# Patient Record
Sex: FEMALE | Race: WHITE | Employment: UNEMPLOYED | URBAN - METROPOLITAN AREA
[De-identification: names, ages, dates, MRNs, and addresses within clinical notes are randomized per-mention and may not be internally consistent; named-entity substitution may affect disease eponyms.]

---

## 2017-05-08 ENCOUNTER — HOSPITAL ENCOUNTER (OUTPATIENT)
Dept: PREADMISSION TESTING | Age: 62
Discharge: HOME | End: 2017-05-08
Payer: OTHER MISCELLANEOUS

## 2017-05-08 VITALS
DIASTOLIC BLOOD PRESSURE: 70 MMHG | HEIGHT: 63 IN | WEIGHT: 165 LBS | HEART RATE: 80 BPM | SYSTOLIC BLOOD PRESSURE: 124 MMHG | BODY MASS INDEX: 29.23 KG/M2

## 2017-05-08 LAB
ABO + RH BLD: NORMAL
BLOOD GROUP ANTIBODIES SERPL: NORMAL
SPECIMEN EXP DATE BLD: NORMAL

## 2017-05-08 PROCEDURE — 86900 BLOOD TYPING SEROLOGIC ABO: CPT | Performed by: ORTHOPAEDIC SURGERY

## 2017-05-08 PROCEDURE — 36415 COLL VENOUS BLD VENIPUNCTURE: CPT | Performed by: ORTHOPAEDIC SURGERY

## 2017-05-08 RX ORDER — LORATADINE AND PSEUDOEPHEDRINE 10; 240 MG/1; MG/1
1 TABLET, EXTENDED RELEASE ORAL DAILY
COMMUNITY

## 2017-05-08 RX ORDER — CHOLECALCIFEROL (VITAMIN D3) 125 MCG
CAPSULE ORAL
COMMUNITY

## 2017-05-08 NOTE — PERIOP NOTES
PREOPERATIVE INSTRUCTIONS REVIEWED WITH PATIENT. PATIENT GIVEN SIX PACK OF CHG WIPES. INSTRUCTIONS REVIEWED IN CLASS ON USE OF CHG WIPES. PATIENT GIVEN SSI INFECTION SHEET. MRSA/MSSA TREATMENT INSTRUCTION SHEET GIVEN WITH AN EXPLANATION TO PATIENT THAT THEY WILL BE NOTIFIED IF TREATMENT INSTRUCTIONS NEED TO BE INITIATED. PATIENT WAS GIVEN THE  OPPORTUNITY TO ASK QUESTIONS ON THE INFORMATION PROVIDED.

## 2017-05-09 LAB
BACTERIA SPEC CULT: NORMAL
BACTERIA SPEC CULT: NORMAL
SERVICE CMNT-IMP: NORMAL

## 2017-05-17 ENCOUNTER — ANESTHESIA EVENT (OUTPATIENT)
Dept: SURGERY | Age: 62
DRG: 470 | End: 2017-05-17
Payer: OTHER MISCELLANEOUS

## 2017-05-18 ENCOUNTER — ANESTHESIA (OUTPATIENT)
Dept: SURGERY | Age: 62
DRG: 470 | End: 2017-05-18
Payer: OTHER MISCELLANEOUS

## 2017-05-18 ENCOUNTER — HOSPITAL ENCOUNTER (INPATIENT)
Age: 62
LOS: 2 days | Discharge: HOME HEALTH | DRG: 470 | End: 2017-05-20
Attending: ORTHOPAEDIC SURGERY | Admitting: ORTHOPAEDIC SURGERY
Payer: OTHER MISCELLANEOUS

## 2017-05-18 PROBLEM — M17.9 OA (OSTEOARTHRITIS) OF KNEE: Status: ACTIVE | Noted: 2017-05-18

## 2017-05-18 LAB
GLUCOSE BLD STRIP.AUTO-MCNC: 98 MG/DL (ref 65–100)
INR PPP: 1 (ref 0.9–1.1)
PROTHROMBIN TIME: 10.2 SEC (ref 9–11.1)
SERVICE CMNT-IMP: NORMAL

## 2017-05-18 PROCEDURE — 74011000250 HC RX REV CODE- 250

## 2017-05-18 PROCEDURE — 77030006835 HC BLD SAW SAG STRY -B: Performed by: ORTHOPAEDIC SURGERY

## 2017-05-18 PROCEDURE — 77030018846 HC SOL IRR STRL H20 ICUM -A: Performed by: ORTHOPAEDIC SURGERY

## 2017-05-18 PROCEDURE — 77030006822 HC BLD SAW SAG BRSM -B: Performed by: ORTHOPAEDIC SURGERY

## 2017-05-18 PROCEDURE — 77030028224 HC PDNG CST BSNM -A: Performed by: ORTHOPAEDIC SURGERY

## 2017-05-18 PROCEDURE — 74011000258 HC RX REV CODE- 258

## 2017-05-18 PROCEDURE — 77030020788: Performed by: ORTHOPAEDIC SURGERY

## 2017-05-18 PROCEDURE — 74011000258 HC RX REV CODE- 258: Performed by: ORTHOPAEDIC SURGERY

## 2017-05-18 PROCEDURE — 77030013079 HC BLNKT BAIR HGGR 3M -A: Performed by: ANESTHESIOLOGY

## 2017-05-18 PROCEDURE — 77030018836 HC SOL IRR NACL ICUM -A: Performed by: ORTHOPAEDIC SURGERY

## 2017-05-18 PROCEDURE — 76010000173 HC OR TIME 3 TO 3.5 HR INTENSV-TIER 1: Performed by: ORTHOPAEDIC SURGERY

## 2017-05-18 PROCEDURE — 77030003601 HC NDL NRV BLK BBMI -A

## 2017-05-18 PROCEDURE — 76060000037 HC ANESTHESIA 3 TO 3.5 HR: Performed by: ORTHOPAEDIC SURGERY

## 2017-05-18 PROCEDURE — 74011250636 HC RX REV CODE- 250/636: Performed by: ANESTHESIOLOGY

## 2017-05-18 PROCEDURE — C1776 JOINT DEVICE (IMPLANTABLE): HCPCS | Performed by: ORTHOPAEDIC SURGERY

## 2017-05-18 PROCEDURE — 77030018822 HC SLV COMPR FT COVD -B

## 2017-05-18 PROCEDURE — C9290 INJ, BUPIVACAINE LIPOSOME: HCPCS | Performed by: ORTHOPAEDIC SURGERY

## 2017-05-18 PROCEDURE — 74011250637 HC RX REV CODE- 250/637: Performed by: ORTHOPAEDIC SURGERY

## 2017-05-18 PROCEDURE — 36415 COLL VENOUS BLD VENIPUNCTURE: CPT | Performed by: ORTHOPAEDIC SURGERY

## 2017-05-18 PROCEDURE — 77030034850: Performed by: ORTHOPAEDIC SURGERY

## 2017-05-18 PROCEDURE — C1713 ANCHOR/SCREW BN/BN,TIS/BN: HCPCS | Performed by: ORTHOPAEDIC SURGERY

## 2017-05-18 PROCEDURE — 77030011640 HC PAD GRND REM COVD -A: Performed by: ORTHOPAEDIC SURGERY

## 2017-05-18 PROCEDURE — 77030035236 HC SUT PDS STRATFX BARB J&J -B: Performed by: ORTHOPAEDIC SURGERY

## 2017-05-18 PROCEDURE — 77030016547 HC BLD SAW SAG1 STRY -B: Performed by: ORTHOPAEDIC SURGERY

## 2017-05-18 PROCEDURE — 3E0T3CZ INTRODUCE REGIONAL ANESTH IN PERIPH NRV, PLEXI, PERC: ICD-10-PCS | Performed by: ANESTHESIOLOGY

## 2017-05-18 PROCEDURE — 74011250636 HC RX REV CODE- 250/636: Performed by: ORTHOPAEDIC SURGERY

## 2017-05-18 PROCEDURE — 74011000250 HC RX REV CODE- 250: Performed by: ORTHOPAEDIC SURGERY

## 2017-05-18 PROCEDURE — 77030002933 HC SUT MCRYL J&J -A: Performed by: ORTHOPAEDIC SURGERY

## 2017-05-18 PROCEDURE — 74011250636 HC RX REV CODE- 250/636

## 2017-05-18 PROCEDURE — 85610 PROTHROMBIN TIME: CPT | Performed by: ORTHOPAEDIC SURGERY

## 2017-05-18 PROCEDURE — 64450 NJX AA&/STRD OTHER PN/BRANCH: CPT

## 2017-05-18 PROCEDURE — 76210000006 HC OR PH I REC 0.5 TO 1 HR: Performed by: ORTHOPAEDIC SURGERY

## 2017-05-18 PROCEDURE — 77030031139 HC SUT VCRL2 J&J -A: Performed by: ORTHOPAEDIC SURGERY

## 2017-05-18 PROCEDURE — 82962 GLUCOSE BLOOD TEST: CPT

## 2017-05-18 PROCEDURE — 77030007866 HC KT SPN ANES BBMI -B: Performed by: ANESTHESIOLOGY

## 2017-05-18 PROCEDURE — 65270000029 HC RM PRIVATE

## 2017-05-18 PROCEDURE — 0SRD0J9 REPLACEMENT OF LEFT KNEE JOINT WITH SYNTHETIC SUBSTITUTE, CEMENTED, OPEN APPROACH: ICD-10-PCS | Performed by: ORTHOPAEDIC SURGERY

## 2017-05-18 DEVICE — PLUG STEM TIV FLX TAPR FOR MG II ST BNE SCR NXGN: Type: IMPLANTABLE DEVICE | Site: KNEE | Status: FUNCTIONAL

## 2017-05-18 DEVICE — IMPLANTABLE DEVICE: Type: IMPLANTABLE DEVICE | Status: FUNCTIONAL

## 2017-05-18 DEVICE — PLATE TIB STEM PC NXGN SZ 3 --: Type: IMPLANTABLE DEVICE | Site: KNEE | Status: FUNCTIONAL

## 2017-05-18 DEVICE — CEMENT BNE 40GM FULL DOSE PMMA W/O ANTIBIO HI VISC N RADPQ: Type: IMPLANTABLE DEVICE | Site: KNEE | Status: FUNCTIONAL

## 2017-05-18 RX ORDER — AMOXICILLIN 250 MG
1 CAPSULE ORAL 2 TIMES DAILY
Status: DISCONTINUED | OUTPATIENT
Start: 2017-05-18 | End: 2017-05-20 | Stop reason: HOSPADM

## 2017-05-18 RX ORDER — PROPOFOL 10 MG/ML
INJECTION, EMULSION INTRAVENOUS
Status: DISCONTINUED | OUTPATIENT
Start: 2017-05-18 | End: 2017-05-18 | Stop reason: HOSPADM

## 2017-05-18 RX ORDER — CEFAZOLIN SODIUM IN 0.9 % NACL 2 G/50 ML
2 INTRAVENOUS SOLUTION, PIGGYBACK (ML) INTRAVENOUS ONCE
Status: COMPLETED | OUTPATIENT
Start: 2017-05-18 | End: 2017-05-18

## 2017-05-18 RX ORDER — HYDROMORPHONE HYDROCHLORIDE 1 MG/ML
0.2 INJECTION, SOLUTION INTRAMUSCULAR; INTRAVENOUS; SUBCUTANEOUS
Status: DISCONTINUED | OUTPATIENT
Start: 2017-05-18 | End: 2017-05-18 | Stop reason: HOSPADM

## 2017-05-18 RX ORDER — DIAZEPAM 2 MG/1
5 TABLET ORAL
Status: DISCONTINUED | OUTPATIENT
Start: 2017-05-18 | End: 2017-05-20 | Stop reason: HOSPADM

## 2017-05-18 RX ORDER — PHENYLEPHRINE HCL IN 0.9% NACL 0.4MG/10ML
SYRINGE (ML) INTRAVENOUS AS NEEDED
Status: DISCONTINUED | OUTPATIENT
Start: 2017-05-18 | End: 2017-05-18 | Stop reason: HOSPADM

## 2017-05-18 RX ORDER — NALOXONE HYDROCHLORIDE 0.4 MG/ML
0.4 INJECTION, SOLUTION INTRAMUSCULAR; INTRAVENOUS; SUBCUTANEOUS AS NEEDED
Status: DISCONTINUED | OUTPATIENT
Start: 2017-05-18 | End: 2017-05-20 | Stop reason: HOSPADM

## 2017-05-18 RX ORDER — DEXTROSE, SODIUM CHLORIDE, SODIUM LACTATE, POTASSIUM CHLORIDE, AND CALCIUM CHLORIDE 5; .6; .31; .03; .02 G/100ML; G/100ML; G/100ML; G/100ML; G/100ML
25 INJECTION, SOLUTION INTRAVENOUS CONTINUOUS
Status: DISCONTINUED | OUTPATIENT
Start: 2017-05-18 | End: 2017-05-18 | Stop reason: HOSPADM

## 2017-05-18 RX ORDER — BACITRACIN ZINC 500 UNIT/G
OINTMENT (GRAM) TOPICAL ONCE
Status: COMPLETED | OUTPATIENT
Start: 2017-05-18 | End: 2017-05-18

## 2017-05-18 RX ORDER — SODIUM CHLORIDE 0.9 % (FLUSH) 0.9 %
5-10 SYRINGE (ML) INJECTION AS NEEDED
Status: DISCONTINUED | OUTPATIENT
Start: 2017-05-18 | End: 2017-05-18 | Stop reason: HOSPADM

## 2017-05-18 RX ORDER — FACIAL-BODY WIPES
10 EACH TOPICAL DAILY PRN
Status: DISCONTINUED | OUTPATIENT
Start: 2017-05-20 | End: 2017-05-20 | Stop reason: HOSPADM

## 2017-05-18 RX ORDER — DEXTROAMPHETAMINE SACCHARATE, AMPHETAMINE ASPARTATE, DEXTROAMPHETAMINE SULFATE AND AMPHETAMINE SULFATE 2.5; 2.5; 2.5; 2.5 MG/1; MG/1; MG/1; MG/1
30 TABLET ORAL
Status: DISCONTINUED | OUTPATIENT
Start: 2017-05-19 | End: 2017-05-20 | Stop reason: HOSPADM

## 2017-05-18 RX ORDER — HYDROMORPHONE HYDROCHLORIDE 1 MG/ML
0.5 INJECTION, SOLUTION INTRAMUSCULAR; INTRAVENOUS; SUBCUTANEOUS
Status: DISPENSED | OUTPATIENT
Start: 2017-05-18 | End: 2017-05-19

## 2017-05-18 RX ORDER — MORPHINE SULFATE 2 MG/ML
2 INJECTION, SOLUTION INTRAMUSCULAR; INTRAVENOUS
Status: DISCONTINUED | OUTPATIENT
Start: 2017-05-18 | End: 2017-05-18 | Stop reason: HOSPADM

## 2017-05-18 RX ORDER — ONDANSETRON 2 MG/ML
4 INJECTION INTRAMUSCULAR; INTRAVENOUS AS NEEDED
Status: DISCONTINUED | OUTPATIENT
Start: 2017-05-18 | End: 2017-05-18 | Stop reason: HOSPADM

## 2017-05-18 RX ORDER — ONDANSETRON 2 MG/ML
INJECTION INTRAMUSCULAR; INTRAVENOUS AS NEEDED
Status: DISCONTINUED | OUTPATIENT
Start: 2017-05-18 | End: 2017-05-18 | Stop reason: HOSPADM

## 2017-05-18 RX ORDER — MELATONIN
2000
Status: DISCONTINUED | OUTPATIENT
Start: 2017-05-19 | End: 2017-05-20 | Stop reason: HOSPADM

## 2017-05-18 RX ORDER — FENTANYL CITRATE 50 UG/ML
INJECTION, SOLUTION INTRAMUSCULAR; INTRAVENOUS AS NEEDED
Status: DISCONTINUED | OUTPATIENT
Start: 2017-05-18 | End: 2017-05-18 | Stop reason: HOSPADM

## 2017-05-18 RX ORDER — CEFAZOLIN SODIUM IN 0.9 % NACL 2 G/50 ML
2 INTRAVENOUS SOLUTION, PIGGYBACK (ML) INTRAVENOUS EVERY 8 HOURS
Status: COMPLETED | OUTPATIENT
Start: 2017-05-18 | End: 2017-05-19

## 2017-05-18 RX ORDER — DIPHENHYDRAMINE HYDROCHLORIDE 50 MG/ML
12.5 INJECTION, SOLUTION INTRAMUSCULAR; INTRAVENOUS AS NEEDED
Status: DISCONTINUED | OUTPATIENT
Start: 2017-05-18 | End: 2017-05-18 | Stop reason: HOSPADM

## 2017-05-18 RX ORDER — SODIUM CHLORIDE, SODIUM LACTATE, POTASSIUM CHLORIDE, CALCIUM CHLORIDE 600; 310; 30; 20 MG/100ML; MG/100ML; MG/100ML; MG/100ML
25 INJECTION, SOLUTION INTRAVENOUS CONTINUOUS
Status: DISCONTINUED | OUTPATIENT
Start: 2017-05-18 | End: 2017-05-18 | Stop reason: HOSPADM

## 2017-05-18 RX ORDER — SODIUM CHLORIDE 0.9 % (FLUSH) 0.9 %
5-10 SYRINGE (ML) INJECTION AS NEEDED
Status: DISCONTINUED | OUTPATIENT
Start: 2017-05-18 | End: 2017-05-20 | Stop reason: HOSPADM

## 2017-05-18 RX ORDER — WARFARIN SODIUM 5 MG/1
5 TABLET ORAL ONCE
Status: COMPLETED | OUTPATIENT
Start: 2017-05-18 | End: 2017-05-18

## 2017-05-18 RX ORDER — KETOROLAC TROMETHAMINE 30 MG/ML
15 INJECTION, SOLUTION INTRAMUSCULAR; INTRAVENOUS EVERY 6 HOURS
Status: DISPENSED | OUTPATIENT
Start: 2017-05-18 | End: 2017-05-19

## 2017-05-18 RX ORDER — SODIUM CHLORIDE 0.9 % (FLUSH) 0.9 %
5-10 SYRINGE (ML) INJECTION EVERY 8 HOURS
Status: DISCONTINUED | OUTPATIENT
Start: 2017-05-18 | End: 2017-05-18 | Stop reason: HOSPADM

## 2017-05-18 RX ORDER — DEXAMETHASONE SODIUM PHOSPHATE 4 MG/ML
INJECTION, SOLUTION INTRA-ARTICULAR; INTRALESIONAL; INTRAMUSCULAR; INTRAVENOUS; SOFT TISSUE AS NEEDED
Status: DISCONTINUED | OUTPATIENT
Start: 2017-05-18 | End: 2017-05-18 | Stop reason: HOSPADM

## 2017-05-18 RX ORDER — MIDAZOLAM HYDROCHLORIDE 1 MG/ML
1 INJECTION, SOLUTION INTRAMUSCULAR; INTRAVENOUS AS NEEDED
Status: DISCONTINUED | OUTPATIENT
Start: 2017-05-18 | End: 2017-05-18 | Stop reason: HOSPADM

## 2017-05-18 RX ORDER — SODIUM CHLORIDE 0.9 % (FLUSH) 0.9 %
5-10 SYRINGE (ML) INJECTION EVERY 8 HOURS
Status: DISCONTINUED | OUTPATIENT
Start: 2017-05-19 | End: 2017-05-20 | Stop reason: HOSPADM

## 2017-05-18 RX ORDER — FENTANYL CITRATE 50 UG/ML
25 INJECTION, SOLUTION INTRAMUSCULAR; INTRAVENOUS
Status: COMPLETED | OUTPATIENT
Start: 2017-05-18 | End: 2017-05-18

## 2017-05-18 RX ORDER — FENTANYL CITRATE 50 UG/ML
50 INJECTION, SOLUTION INTRAMUSCULAR; INTRAVENOUS AS NEEDED
Status: DISCONTINUED | OUTPATIENT
Start: 2017-05-18 | End: 2017-05-18 | Stop reason: HOSPADM

## 2017-05-18 RX ORDER — PROPOFOL 10 MG/ML
INJECTION, EMULSION INTRAVENOUS AS NEEDED
Status: DISCONTINUED | OUTPATIENT
Start: 2017-05-18 | End: 2017-05-18 | Stop reason: HOSPADM

## 2017-05-18 RX ORDER — SODIUM CHLORIDE 9 MG/ML
125 INJECTION, SOLUTION INTRAVENOUS CONTINUOUS
Status: DISPENSED | OUTPATIENT
Start: 2017-05-18 | End: 2017-05-19

## 2017-05-18 RX ORDER — MIDAZOLAM HYDROCHLORIDE 1 MG/ML
INJECTION, SOLUTION INTRAMUSCULAR; INTRAVENOUS AS NEEDED
Status: DISCONTINUED | OUTPATIENT
Start: 2017-05-18 | End: 2017-05-18 | Stop reason: HOSPADM

## 2017-05-18 RX ORDER — OXYCODONE HYDROCHLORIDE 5 MG/1
5 TABLET ORAL
Status: DISCONTINUED | OUTPATIENT
Start: 2017-05-18 | End: 2017-05-20 | Stop reason: HOSPADM

## 2017-05-18 RX ORDER — OXYCODONE HYDROCHLORIDE 5 MG/1
10 TABLET ORAL
Status: DISCONTINUED | OUTPATIENT
Start: 2017-05-18 | End: 2017-05-20 | Stop reason: HOSPADM

## 2017-05-18 RX ORDER — BUPIVACAINE HYDROCHLORIDE 7.5 MG/ML
INJECTION, SOLUTION EPIDURAL; RETROBULBAR AS NEEDED
Status: DISCONTINUED | OUTPATIENT
Start: 2017-05-18 | End: 2017-05-18 | Stop reason: HOSPADM

## 2017-05-18 RX ORDER — ACETAMINOPHEN 325 MG/1
650 TABLET ORAL
Status: DISCONTINUED | OUTPATIENT
Start: 2017-05-18 | End: 2017-05-19

## 2017-05-18 RX ORDER — LIDOCAINE HYDROCHLORIDE 10 MG/ML
0.1 INJECTION, SOLUTION EPIDURAL; INFILTRATION; INTRACAUDAL; PERINEURAL AS NEEDED
Status: DISCONTINUED | OUTPATIENT
Start: 2017-05-18 | End: 2017-05-18 | Stop reason: HOSPADM

## 2017-05-18 RX ORDER — SODIUM CHLORIDE, SODIUM LACTATE, POTASSIUM CHLORIDE, CALCIUM CHLORIDE 600; 310; 30; 20 MG/100ML; MG/100ML; MG/100ML; MG/100ML
INJECTION, SOLUTION INTRAVENOUS
Status: DISCONTINUED | OUTPATIENT
Start: 2017-05-18 | End: 2017-05-18 | Stop reason: HOSPADM

## 2017-05-18 RX ORDER — SODIUM CHLORIDE 9 MG/ML
1000 INJECTION, SOLUTION INTRAVENOUS CONTINUOUS
Status: DISCONTINUED | OUTPATIENT
Start: 2017-05-18 | End: 2017-05-18 | Stop reason: HOSPADM

## 2017-05-18 RX ORDER — POLYETHYLENE GLYCOL 3350 17 G/17G
17 POWDER, FOR SOLUTION ORAL DAILY
Status: DISCONTINUED | OUTPATIENT
Start: 2017-05-19 | End: 2017-05-20 | Stop reason: HOSPADM

## 2017-05-18 RX ORDER — SODIUM CHLORIDE 9 MG/ML
25 INJECTION, SOLUTION INTRAVENOUS CONTINUOUS
Status: DISCONTINUED | OUTPATIENT
Start: 2017-05-18 | End: 2017-05-18 | Stop reason: HOSPADM

## 2017-05-18 RX ORDER — MIDAZOLAM HYDROCHLORIDE 1 MG/ML
0.5 INJECTION, SOLUTION INTRAMUSCULAR; INTRAVENOUS
Status: DISCONTINUED | OUTPATIENT
Start: 2017-05-18 | End: 2017-05-18 | Stop reason: HOSPADM

## 2017-05-18 RX ADMIN — BUPIVACAINE HYDROCHLORIDE 12 MG: 7.5 INJECTION, SOLUTION EPIDURAL; RETROBULBAR at 11:27

## 2017-05-18 RX ADMIN — PROPOFOL 40 MCG/KG/MIN: 10 INJECTION, EMULSION INTRAVENOUS at 11:33

## 2017-05-18 RX ADMIN — FENTANYL CITRATE 50 MCG: 50 INJECTION, SOLUTION INTRAMUSCULAR; INTRAVENOUS at 09:22

## 2017-05-18 RX ADMIN — FENTANYL CITRATE 25 MCG: 50 INJECTION, SOLUTION INTRAMUSCULAR; INTRAVENOUS at 16:03

## 2017-05-18 RX ADMIN — PROPOFOL 25 MG: 10 INJECTION, EMULSION INTRAVENOUS at 11:54

## 2017-05-18 RX ADMIN — WARFARIN SODIUM 5 MG: 5 TABLET ORAL at 18:29

## 2017-05-18 RX ADMIN — MIDAZOLAM HYDROCHLORIDE 2 MG: 1 INJECTION, SOLUTION INTRAMUSCULAR; INTRAVENOUS at 09:21

## 2017-05-18 RX ADMIN — SODIUM CHLORIDE, SODIUM LACTATE, POTASSIUM CHLORIDE, CALCIUM CHLORIDE: 600; 310; 30; 20 INJECTION, SOLUTION INTRAVENOUS at 11:09

## 2017-05-18 RX ADMIN — DEXAMETHASONE SODIUM PHOSPHATE 8 MG: 4 INJECTION, SOLUTION INTRA-ARTICULAR; INTRALESIONAL; INTRAMUSCULAR; INTRAVENOUS; SOFT TISSUE at 11:46

## 2017-05-18 RX ADMIN — PROPOFOL 50 MG: 10 INJECTION, EMULSION INTRAVENOUS at 11:38

## 2017-05-18 RX ADMIN — ONDANSETRON 4 MG: 2 INJECTION INTRAMUSCULAR; INTRAVENOUS at 12:30

## 2017-05-18 RX ADMIN — DOCUSATE SODIUM AND SENNOSIDES 1 TABLET: 8.6; 5 TABLET, FILM COATED ORAL at 18:29

## 2017-05-18 RX ADMIN — FENTANYL CITRATE 25 MCG: 50 INJECTION, SOLUTION INTRAMUSCULAR; INTRAVENOUS at 16:10

## 2017-05-18 RX ADMIN — FENTANYL CITRATE 25 MCG: 50 INJECTION, SOLUTION INTRAMUSCULAR; INTRAVENOUS at 15:57

## 2017-05-18 RX ADMIN — FENTANYL CITRATE 25 MCG: 50 INJECTION, SOLUTION INTRAMUSCULAR; INTRAVENOUS at 16:23

## 2017-05-18 RX ADMIN — SODIUM CHLORIDE, SODIUM LACTATE, POTASSIUM CHLORIDE, AND CALCIUM CHLORIDE 25 ML/HR: 600; 310; 30; 20 INJECTION, SOLUTION INTRAVENOUS at 08:53

## 2017-05-18 RX ADMIN — SODIUM CHLORIDE, SODIUM LACTATE, POTASSIUM CHLORIDE, CALCIUM CHLORIDE: 600; 310; 30; 20 INJECTION, SOLUTION INTRAVENOUS at 13:00

## 2017-05-18 RX ADMIN — KETOROLAC TROMETHAMINE 15 MG: 30 INJECTION, SOLUTION INTRAMUSCULAR at 18:29

## 2017-05-18 RX ADMIN — Medication 80 MCG: at 13:51

## 2017-05-18 RX ADMIN — OXYCODONE HYDROCHLORIDE 5 MG: 5 TABLET ORAL at 21:21

## 2017-05-18 RX ADMIN — Medication 80 MCG: at 11:58

## 2017-05-18 RX ADMIN — PROPOFOL 50 MG: 10 INJECTION, EMULSION INTRAVENOUS at 11:33

## 2017-05-18 RX ADMIN — PROPOFOL 100 MG: 10 INJECTION, EMULSION INTRAVENOUS at 13:04

## 2017-05-18 RX ADMIN — MIDAZOLAM HYDROCHLORIDE 2 MG: 1 INJECTION, SOLUTION INTRAMUSCULAR; INTRAVENOUS at 11:20

## 2017-05-18 RX ADMIN — CEFAZOLIN 2 G: 1 INJECTION, POWDER, FOR SOLUTION INTRAMUSCULAR; INTRAVENOUS; PARENTERAL at 19:09

## 2017-05-18 RX ADMIN — Medication 80 MCG: at 12:07

## 2017-05-18 RX ADMIN — FENTANYL CITRATE 50 MCG: 50 INJECTION, SOLUTION INTRAMUSCULAR; INTRAVENOUS at 11:20

## 2017-05-18 RX ADMIN — HYDROMORPHONE HYDROCHLORIDE 0.5 MG: 1 INJECTION, SOLUTION INTRAMUSCULAR; INTRAVENOUS; SUBCUTANEOUS at 17:16

## 2017-05-18 RX ADMIN — SODIUM CHLORIDE 125 ML/HR: 900 INJECTION, SOLUTION INTRAVENOUS at 15:55

## 2017-05-18 RX ADMIN — CEFAZOLIN 2 G: 1 INJECTION, POWDER, FOR SOLUTION INTRAMUSCULAR; INTRAVENOUS; PARENTERAL at 11:37

## 2017-05-18 NOTE — ANESTHESIA PROCEDURE NOTES
Spinal Block    Start time: 5/18/2017 11:25 AM  End time: 5/18/2017 11:30 AM  Performed by: Dev Enciso  Authorized by: Elijah Retana     Pre-procedure:   Indications: primary anesthetic  Preanesthetic Checklist: risks and benefits discussed and timeout performed      Spinal Block:   Patient Position:  Seated    Prep: Betadine      Location:  L3-4  Technique:  Single shot  Local:  Lidocaine 1%  Local Dose (mL):  3    Needle:   Needle Type:  Pencil-tip  Needle Gauge:  25 G  Attempts:  1      Events: CSF confirmed, no blood with aspiration and no paresthesia        Assessment:  Insertion:  Uncomplicated  Patient tolerance:  Patient tolerated the procedure well with no immediate complications

## 2017-05-18 NOTE — PROGRESS NOTES
Bedside and Verbal shift change report given to Tiarra (oncoming nurse) by Sally Dominguez (offgoing nurse). Report included the following information SBAR, Kardex, Procedure Summary, Intake/Output and Recent Results.

## 2017-05-18 NOTE — OP NOTES
[address]   [address] [place] [address], [address]   OP NOTE       Name:  Ramses Cullen   MR#:  [id number]   :  1955   Account #:  [de-identified]    Surgery Date:  2017   Date of Adm:  2017       PREOPERATIVE DIAGNOSIS: Osteoarthritis of the left knee. POSTOPERATIVE DIAGNOSIS: Osteoarthritis of the left knee. PROCEDURES PERFORMED: Left total knee arthroplasty. SURGEON: Ivon Brown MD.    ASSISTANT: Tech. ANESTHESIA: Spinal with MAC. ESTIMATED BLOOD LOSS: Less than 200 mL. FLUIDS: Crystalloids were given. COMPLICATIONS: Zero. PREOPERATIVE MEDICINE: Two grams Ancef. SPECIMENS REMOVED: Bone discarded. IMPLANT: This was a full titanium knee from a [employer] NexGen   system with a size F femur, size 3 tibia with a 10 mm post-stabilized   poly, and a 32 patella. All components cemented. HISTORY: The patient is a 71-year-old who presented to me from one   of my partners. She had developed increasing discomfort of her left   knee secondary osteoarthritis of the knee joint. She had conservative   treatment performed for this. She had also had a knee arthroscopy   performed by another surgeon. She had bone-on-bone arthritis and   significant patellofemoral crepitation with evidence of arthritis. In   consultation with the patient, we discussed different treatment options. I felt due to the deformity and pain that she was a good candidate for   total knee replacement. We talked about the risk of infection, DVT, PE,   MI, CVA, and other unforeseen events that could occur in a   perioperative fashion. We talked about the risk of continued pain and   loss of range of motion, understanding the artificial joints do sometimes   not restore full functionality of the joint. She understood metal and   plastic would be implanted in the body. During our initial conversation   in the office, the patient had NO ALLERGIES LISTED.  Through her   intake here at [facility], the patient had told a nurse that she   had INFLAMMATORY REACTION WITH THE USE OF EARRINGS. SHE TOLD ME THAT WHEN SHE USES EARRINGS, SHE HAS   SOMETIMES INFECTIONS IN HER EAR LOBES SO, THEREFORE,   SHE DOES NOT USE THESE ITEMS. She has worn jewelry before   without any problems, but currently does not wear through jewelry. Interestingly, I also worked on her mother as well as her sister. Her   sister had prior total knee replacement and was doing very well. I was   able to relocate the operative note from her sister and indeed her sister   did actually give me this information and we used a Zirconium knee   because of this. I talked to the patient about this, we were   not equipped to have that system here today. We did make several   phone calls and we were able to locate a product that is made by   [employer], which does not have a nickel in the system. I felt that this was   a good opportunity to continue to proceed with the surgery. This was   the NexGen knee system which I am very comfortable with it. I   explained to the patient the situation and she understood and was   willing to proceed with the surgery. Again, discussing all of this and   all the risks already mentioned, the patient wished to proceed, signed   consent and was taken to surgery. DESCRIPTION OF PROCEDURE: The patient was taken back to   surgery, placed supine on the table, administered spinal anesthetic by   an anesthesiologist. A Rodríguez was placed by an RN. A tourniquet was   applied on the left upper thigh. The patient was placed supine with all   bony prominences protected. Ancef 2 grams was administered. At that   time, a sterile prep and drape was performed on the left knee. A time-  out was performed and all parties agreed the left knee was the correct   knee. An Esmarch was used to exsanguinate the extremity. Tourniquet   was inflated up to 325 mmHg.  An anterior midline incision was made   with the 10 blade, sharp dissection taken down the skin and blunt   dissection was taken down to the extensor mechanism. A standard   medial parapatellar arthrotomy was performed with a deep 10 blade. Anteromedial dissection was performed along the tibia. The patella   was everted. Fat pad was removed. Freehand resection of the patella   was performed. We sized this to be 32 and appropriate drill holes were   made. The IM canal of the femur was entered with a reamer. We   placed a distal cutting block in place, set this at a 10 degree cut at 5-  degree valgus and the cut was made respectively. The ACL and the   PCL was removed. The extramedullary jig was placed over the knee. Sagittal slope and varus-valgus angulation was corrected. The cutting   block was inserted with bone pegs and the cut was made. We felt an   additional 2 mm would be necessary after finishing this cut and this   was performed. The bone stock was removed. We then placed a 3-  degree external rotational jig on the femur and used the reference   guide anteriorly. This sized to be an E to an F. We made the   appropriate drill holes for the cutting block. We placed an E and then   an F then E, demonstrated too much bone removal as compared to the   F so we sized to an F. This was screwed into place and appropriate   cuts were made respectively. We then placed the notch guide and   notched the femur in standard fashion. The lateral meniscus and   medial meniscus was then removed. We checked the flexion and   extension gap. We seemed to be still a little bit tight medially. A more   formal medial release was performed as this patient did have a varus   deformity. This did improve. The patient was still a little tight in   extension. At that time, we did trial and trial with a 10 mm did reveal   some tightness. With that we dropped the cutting block on the tibia 2   more millimeters and made another cut, we retrialed and this was more   appropriate for the patient.  All trial components were removed. We   sized the tibia to be a size 3. It was then drilled and broached in   standard fashion. Exparel was then injected in the posterior   compartment. The bone stock was pulsatilely irrigated while cement   was mixed on the back table. We cemented the size 3 tibia and size F   femur, and placed a trial poly into the tray. The knee was extended and   then the patella was cemented. Any excess cement was removed. The   knee was left alone until full curing of the cement. Following curing, we   placed the knee through a range of motion. We trialed with a 10 and   then a 12. Again, the 10 had full extension without jeopardizing   instability. The trial was removed. The wound was copiously irrigated. we inserted the final tray into place and relocated the knee. The   tourniquet was let down. Bleeding was controlled with electrocautery. The wound was copiously irrigated. Combination #2 Vicryl and   Stratafix was used to close the extensor mechanism. A 2-0 Vicryl was   placed in the subcutaneous and dermal layer. A running 4-0 Monocryl   was placed in the epidermis. Steri-Strips were applied over the   incision, followed by Adaptic, Bacitracin ointment and a sterile dry   dressing. The patient was awakened from sedation, transferred to the   recovery room in stable condition.         MD Sarai Griffith / Marlon.Blinks   D:  05/18/2017   14:23   T:  05/18/2017   14:56   Job #:  [id number]

## 2017-05-18 NOTE — H&P
[facility] PRE-OP HISTORY AND PHYSICAL    Subjective:     Patient is a 65 y.o. female presented with a history of left knee pain. Onset of symptoms was gradual with gradually worsening course since that time. She is being admitted for surgical management of this condition. There are no active problems to display for this patient. Past Medical History:   Diagnosis Date    Arthritis     OSTEO    Ill-defined condition     SINUSSITIS, & BRONCHITIS FREQUENTLY      Past Surgical History:   Procedure Laterality Date    HX GI      COLONOSCOPY     HX HEENT      WISDOM TEETH EXTRACTION X 4    HX ORTHOPAEDIC      LEFT KNEE ARTHROSCOPY. Prior to Admission medications    Medication Sig Start Date End Date Taking? Authorizing Provider   cholecalciferol, vitamin D3, (VITAMIN D3) 2,000 unit tab Take  by mouth Daily (before breakfast). Yes Historical Provider   hydrocodone-acetaminophen (NORCO) 5-325 mg per tablet Take 1 tablet by mouth every four (4) hours as needed for Pain. 11/5/14  Yes Deann Renee, DO   loratadine-pseudoephedrine (CLARITIN-D 24 HOUR)  mg per tablet Take 1 Tab by mouth daily. USES GENERIC FOR [facility] AS NEEDED. Historical Provider   dextroamphetamine-amphetamine (ADDERALL) 30 mg tablet Take 30 mg by mouth Daily (before breakfast). PT TAKES BREAKS FROM MED. Lizandro.Bustle OUT OF 7 DAYS\"          Phys MD Gurmeet   diazepam (VALIUM) 5 mg tablet Take 1 tablet by mouth every eight (8) hours as needed for Anxiety.  11/5/14   Deann Renee, DO     Allergies   Allergen Reactions    Nickel Other (comments)     \"SWELLING, & SORENESS\"      Social History   Substance Use Topics    Smoking status: Never Smoker    Smokeless tobacco: Never Used    Alcohol use Yes      Comment: RARELY      Family History   Problem Relation Age of Onset    Heart Disease Mother     Hypertension Mother     Hypertension Father     Heart Disease Father     Heart Disease Sister      HEART MURMUR    Cancer Brother     Heart Disease Brother      ENLARGED HEART    Stroke Maternal Grandmother     Hypertension Maternal Grandmother     Heart Disease Maternal Grandfather       Review of Systems  A comprehensive review of systems was negative except for that written in the HPI. Objective:     Patient Vitals for the past 8 hrs:   BP Temp Pulse Resp SpO2 Height Weight   05/18/17 0931 113/71 - 65 15 - - -   05/18/17 0910 130/82 - 67 18 - - -   05/18/17 0855 - 98 °F (36.7 °C) - - - - -   05/18/17 0831 129/84 (P) 97.9 °F (36.6 °C) 67 18 100 % 5' 3\" (1.6 m) 74.8 kg (165 lb)     Visit Vitals    /71 (BP 1 Location: Right arm, BP Patient Position: At rest;Supine)    Pulse 65    Temp 98 °F (36.7 °C)    Resp 15    Ht 5' 3\" (1.6 m)    Wt 74.8 kg (165 lb)    SpO2 100%    BMI 29.23 kg/m2     General:  Alert, cooperative, no distress, appears stated age. Head:  Normocephalic, without obvious abnormality, atraumatic. Eyes:  Conjunctivae/corneas clear. PERRL, EOMs intact. Fundi benign. Ears:  Normal TMs and external ear canals both ears. Nose: Nares normal. Septum midline. Mucosa normal. No drainage or sinus tenderness. Throat: Lips, mucosa, and tongue normal. Teeth and gums normal.   Neck: Supple, symmetrical, trachea midline, no adenopathy, thyroid: no enlargement/tenderness/nodules, no carotid bruit and no JVD. Back:   Symmetric, no curvature. ROM normal. No CVA tenderness. Lungs:   Clear to auscultation bilaterally. Chest wall:  No tenderness or deformity. Heart:  Regular rate and rhythm, S1, S2 normal, no murmur, click, rub or gallop. Abdomen:   Soft, non-tender. Bowel sounds normal. No masses,  No organomegaly. Extremities: Left knee: pain with ROM. + crepitation. nvi   Pulses: 2+ and symmetric all extremities. Skin: Skin color, texture, turgor normal. No rashes or lesions. Lymph nodes: Cervical, supraclavicular, and axillary nodes normal.   Neurologic: CNII-XII intact.  Normal strength, sensation and reflexes throughout. Assessment:     Active Problems:    * No active hospital problems. *      Plan:     The various methods of treatment have been discussed with the patient and family. After consideration of risks, benefits and other options for treatment, the patient has consented to surgical intervention. Risks of infection, DVT, PE, MI, CVA and unforeseen events described to the patient. Additionally discussed the possibility of not being able to resolve all preop pain. Patient understands metal and plastic will be implanted in the body and understands the potential for revision surgery. Patient wishes to proceed with elective surgery.

## 2017-05-18 NOTE — ANESTHESIA PREPROCEDURE EVALUATION
Anesthetic History   No history of anesthetic complications            Review of Systems / Medical History  Patient summary reviewed, nursing notes reviewed and pertinent labs reviewed    Pulmonary  Within defined limits                 Neuro/Psych   Within defined limits           Cardiovascular  Within defined limits                     GI/Hepatic/Renal  Within defined limits              Endo/Other        Obesity     Other Findings            Physical Exam    Airway  Mallampati: II  TM Distance: > 6 cm  Neck ROM: normal range of motion   Mouth opening: Normal     Cardiovascular  Regular rate and rhythm,  S1 and S2 normal,  no murmur, click, rub, or gallop             Dental  No notable dental hx       Pulmonary  Breath sounds clear to auscultation               Abdominal  GI exam deferred       Other Findings            Anesthetic Plan    ASA: 1  Anesthesia type: spinal          Induction: Intravenous  Anesthetic plan and risks discussed with: Patient

## 2017-05-18 NOTE — PROGRESS NOTES
Primary Nurse Tanner Parks RN and Snehal Tovar RN performed a dual skin assessment on this patient impairment noted  Anil score is 21    Patient has poison ivy on LUE. Small scratches on upper back.

## 2017-05-18 NOTE — ANESTHESIA PROCEDURE NOTES
Peripheral Block    Start time: 5/18/2017 9:14 AM  End time: 5/18/2017 9:19 AM  Performed by: Hunter Rain  Authorized by: Hunter Rain       Pre-procedure: Indications: at surgeon's request and post-op pain management    Preanesthetic Checklist: patient identified, risks and benefits discussed, site marked, timeout performed and patient being monitored      Block Type:   Block Type:   Adductor canal  Laterality:  Left  Monitoring:  Standard ASA monitoring, continuous pulse ox, frequent vital sign checks, heart rate, responsive to questions and oxygen  Injection Technique:  Single shot  Procedures: ultrasound guided    Patient Position: supine  Prep: chlorhexidine    Location:  Mid thigh  Needle Type:  Stimuplex  Needle Gauge:  22 G  Needle Localization:  Ultrasound guidance  Medication Injected:  0.5%  ropivacaine  Volume (mL):  20    Assessment:  Number of attempts:  1  Injection Assessment:  Incremental injection every 5 mL, local visualized surrounding nerve on ultrasound, negative aspiration for blood, no paresthesia, no intravascular symptoms and ultrasound image on chart  Patient tolerance:  Patient tolerated the procedure well with no immediate complications

## 2017-05-18 NOTE — IP AVS SNAPSHOT
Current Discharge Medication List  
  
START taking these medications Dose & Instructions Dispensing Information Comments Morning Noon Evening Bedtime  
 ondansetron 4 mg disintegrating tablet Commonly known as:  ZOFRAN ODT Your last dose was: Your next dose is:    
   
   
 Dose:  4 mg Take 1 Tab by mouth every eight (8) hours as needed for Nausea. Quantity:  30 Tab Refills:  0  
     
   
   
   
  
 * oxyCODONE IR 5 mg immediate release tablet Commonly known as:  Roberta Leroy Your last dose was: Your next dose is:    
   
   
 Dose:  10 mg Take 2 Tabs by mouth every four (4) hours as needed for Pain. Max Daily Amount: 60 mg.  
 Quantity:  60 Tab Refills:  0  
     
   
   
   
  
 * oxyCODONE IR 5 mg immediate release tablet Commonly known as:  Roberta Haipaige Your last dose was: Your next dose is:    
   
   
 Dose:  5 mg Take 1 Tab by mouth every four (4) hours as needed for Pain. Max Daily Amount: 30 mg.  
 Quantity:  20 Tab Refills:  0  
     
   
   
   
  
 warfarin 2.5 mg tablet Commonly known as:  COUMADIN Your last dose was: Your next dose is:    
   
   
 Dose:  2.5 mg Take 1 Tab by mouth daily. Quantity:  60 Tab Refills:  3  
     
   
   
   
  
 * Notice: This list has 2 medication(s) that are the same as other medications prescribed for you. Read the directions carefully, and ask your doctor or other care provider to review them with you. CONTINUE these medications which have NOT CHANGED Dose & Instructions Dispensing Information Comments Morning Noon Evening Bedtime ADDERALL 30 mg tablet Generic drug:  dextroamphetamine-amphetamine Your last dose was: Your next dose is:    
   
   
 Dose:  30 mg Take 30 mg by mouth Daily (before breakfast). PT TAKES BREAKS FROM MED. \"4 OUT OF 7 DAYS\" Refills:  0 CLARITIN-D 24 HOUR  mg per tablet Generic drug:  loratadine-pseudoephedrine Your last dose was: Your next dose is:    
   
   
 Dose:  1 Tab Take 1 Tab by mouth daily. USES GENERIC FOR [facility] AS NEEDED. Refills:  0  
     
   
   
   
  
 diazePAM 5 mg tablet Commonly known as:  VALIUM Your last dose was: Your next dose is:    
   
   
 Dose:  5 mg Take 1 tablet by mouth every eight (8) hours as needed for Anxiety. Quantity:  12 tablet Refills:  0  
     
   
   
   
  
 VITAMIN D3 2,000 unit Tab Generic drug:  cholecalciferol (vitamin D3) Your last dose was: Your next dose is: Take  by mouth Daily (before breakfast). Refills:  0 STOP taking these medications HYDROcodone-acetaminophen 5-325 mg per tablet Commonly known as:  Hema Ripley Where to Get Your Medications Information on where to get these meds will be given to you by the nurse or doctor. ! Ask your nurse or doctor about these medications  
  ondansetron 4 mg disintegrating tablet  
 oxyCODONE IR 5 mg immediate release tablet  
 oxyCODONE IR 5 mg immediate release tablet  
 warfarin 2.5 mg tablet

## 2017-05-18 NOTE — PROGRESS NOTES
Patient ambulated to door and back without issue. Orthostatics taken.       05/18/17 1901   Vital Signs   Temp 98.2 °F (36.8 °C)   Temp Source Oral   Pulse (Heart Rate) 67   Resp Rate 16   O2 Sat (%) 100 %   Level of Consciousness Alert   /80   MAP (Calculated) 102   BP 1 Method Automatic   BP 1 Location Left arm   BP Patient Position At rest   MEWS Score 1        05/18/17 1901   Additional Blood Pressure/Pulse Data   Pulse 2 95   BP 2 (!) 126/91   MAP 2 (Calculated) 103   Patient Position 2 Sitting   Pulse 3 85   BP 3 139/85   MAP 3 (Calculated) 103   BP Method 3 Automatic   Patient Position 3 Standing

## 2017-05-18 NOTE — PROGRESS NOTES
TRANSFER - IN REPORT:    Verbal report received from kraig(name) on [place]  being received from [facility]) for routine post - op      Report consisted of patients Situation, Background, Assessment and   Recommendations(SBAR). Information from the following report(s) SBAR, Kardex, OR Summary, Procedure Summary, Intake/Output, MAR and Recent Results was reviewed with the receiving nurse. Opportunity for questions and clarification was provided. Assessment completed upon patients arrival to unit and care assumed.

## 2017-05-18 NOTE — IP AVS SNAPSHOT
[facility] [address] [place] [address] 
[phone] Patient: Adrianne Negron MRN: [id number] MIF:[id number]/[id number] You are allergic to the following Allergen Reactions Nickel Other (comments) \"SWELLING, & SORENESS\" Recent Documentation Height Weight BMI OB Status Smoking Status 1.6 m 74.8 kg 29.23 kg/m2 Postmenopausal Never Smoker Emergency Contacts Name Discharge Info Relation Home Work Mobile [facility] H[facility]PITAL DISCHARGE CAREGIVER [3] Sister [23] [id number] [phone] About your hospitalization You were admitted on:  May 18, 2017 You last received care in the:  [facility] You were discharged on:  May 20, 2017 Unit phone number:  [phone] Why you were hospitalized Your primary diagnosis was:  Not on File Your diagnoses also included:  Oa (Osteoarthritis) Of Knee Providers Seen During Your Hospitalizations Provider Role Specialty Primary office phone Albertina Collado MD Attending Provider Orthopedic Surgery [phone] Your Primary Care Physician (PCP) Primary Care Physician Office Phone Office Fax Angela Schroeder [phone] Follow-up Information Follow up With Details Comments Contact Info Harmeet Goodson NP   [address] [place] [address] 
[phone] Call in 1 day If you have not heard from them by noon on Monday please call facility. [facility] [phone] Call in 1 day If you have not heard from them by noon tomorrow please call facility. Hank Orantes [phone] Current Discharge Medication List  
  
START taking these medications Dose & Instructions Dispensing Information Comments Morning Noon Evening Bedtime  
 ondansetron 4 mg disintegrating tablet Commonly known as:  ZOFRAN ODT  
   
 Your last dose was: Your next dose is:    
   
   
 Dose:  4 mg Take 1 Tab by mouth every eight (8) hours as needed for Nausea. Quantity:  30 Tab Refills:  0  
     
   
   
   
  
 * oxyCODONE IR 5 mg immediate release tablet Commonly known as:  Tia Flatter Your last dose was: Your next dose is:    
   
   
 Dose:  10 mg Take 2 Tabs by mouth every four (4) hours as needed for Pain. Max Daily Amount: 60 mg.  
 Quantity:  60 Tab Refills:  0  
     
   
   
   
  
 * oxyCODONE IR 5 mg immediate release tablet Commonly known as:  Tia Flatter Your last dose was: Your next dose is:    
   
   
 Dose:  5 mg Take 1 Tab by mouth every four (4) hours as needed for Pain. Max Daily Amount: 30 mg.  
 Quantity:  20 Tab Refills:  0  
     
   
   
   
  
 warfarin 2.5 mg tablet Commonly known as:  COUMADIN Your last dose was: Your next dose is:    
   
   
 Dose:  2.5 mg Take 1 Tab by mouth daily. Quantity:  60 Tab Refills:  3  
     
   
   
   
  
 * Notice: This list has 2 medication(s) that are the same as other medications prescribed for you. Read the directions carefully, and ask your doctor or other care provider to review them with you. CONTINUE these medications which have NOT CHANGED Dose & Instructions Dispensing Information Comments Morning Noon Evening Bedtime ADDERALL 30 mg tablet Generic drug:  dextroamphetamine-amphetamine Your last dose was: Your next dose is:    
   
   
 Dose:  30 mg Take 30 mg by mouth Daily (before breakfast). PT TAKES BREAKS FROM MED. \"4 OUT OF 7 DAYS\" Refills:  0 CLARITIN-D 24 HOUR  mg per tablet Generic drug:  loratadine-pseudoephedrine Your last dose was: Your next dose is:    
   
   
 Dose:  1 Tab Take 1 Tab by mouth daily. USES GENERIC FOR [facility] AS NEEDED. Refills:  0  
     
   
   
   
  
 diazePAM 5 mg tablet Commonly known as:  VALIUM Your last dose was: Your next dose is:    
   
   
 Dose:  5 mg Take 1 tablet by mouth every eight (8) hours as needed for Anxiety. Quantity:  12 tablet Refills:  0  
     
   
   
   
  
 VITAMIN D3 2,000 unit Tab Generic drug:  cholecalciferol (vitamin D3) Your last dose was: Your next dose is: Take  by mouth Daily (before breakfast). Refills:  0 STOP taking these medications HYDROcodone-acetaminophen 5-325 mg per tablet Commonly known as:  Monty Melchor Where to Get Your Medications Information on where to get these meds will be given to you by the nurse or doctor. ! Ask your nurse or doctor about these medications  
  ondansetron 4 mg disintegrating tablet  
 oxyCODONE IR 5 mg immediate release tablet  
 oxyCODONE IR 5 mg immediate release tablet  
 warfarin 2.5 mg tablet Discharge Instructions [facility]. Total Knee Replacement Post-Op Discharge Instructions Dr. Aleida Wade Diet Eat a regular diet. Drink plenty of fluids. Eat foods high in fiber and protein and low in fat. Avoid alcoholic beverages. Avoid smoking. Activities You are going home a well person, so be as active as possible. Walk with your walker or crutches. Continue using your walker or crutches until seen for your follow-up visit. Avoid low chairs and slippery surfaces. Avoid twisting your knee. Do not sit longer than 45 minutes at a time. During the daytime, get up every hour and take a brief walk. Exercises Perform your exercise routine 3 times a day as instructed by the physical therapist.  Gradually increase the repetitions of the exercises. You may place an icebag on your knee for 5-10 minutes after exercising. Please place a cloth between the ice and skin.   You should walk daily, each time lengthening your walking distance as your strength improves. Medications Take Coumadin daily to prevent blood clots. Do not take aspirin or anti-inflammatory medicines while you are taking Coumadin. Take a multivitamin with iron once a day for a month. You may need to take a laxative or stool-softener if you experience constipation. You will be given a prescription for roxicodone for pain. Take as directed. Take pain medication with food. You will find that you will decrease the amount you use as your pain lessens. Incision Care You will have Steri strips (tapes) on your knee incision. Do not remove them. They will come off on their own. You may take a shower when your incision is dry, generally about a week after surgery. It is OK to let the water run over your incision, but do not soak the knee in water. You should keep a dressing on your wound if there is any drainage; change daily. Wear your elastic stockings for 4 weeks. You may remove them for approximately 1 hour when showering/sponge-bathing. Do not shower if the wound has drainage. Follow-up office visit See Dr. Rachel Goss in 2-3 weeks. Call to make an appointment:  [phone]. Reasons to call the doctor 1. Increased redness, swelling, or drainage from your incision site. 2.  Temperature consistently greater than 102 degrees. 3.  Increased pain or unrelieved pain. 4.  Calf or chest pain. Home Health Physical therapy - routine exercises, transfers, gait training, active straight leg raises, 3 times a week for 3 weeks. Home nursing - draw a pro-time every Wednesday for 3 weeks. Fax the results to Dr. Rachel Goss at [phone]. Call abnormal results to [phone]. Other Instructions Refer to recovering at home, page 41, of your patient handbook for more instructions. Shopparity Activation Thank you for requesting access to [employer].  Please follow the instructions below to securely access and download your online medical record. LoiLo allows you to send messages to your doctor, view your test results, renew your prescriptions, schedule appointments, and more. How Do I Sign Up? 1. In your internet browser, go to [email or web address] 
2. Click on the First Time User? Click Here link in the Sign In box. You will be redirect to the New Member Sign Up page. 3. Enter your LoiLo Access Code exactly as it appears below. You will not need to use this code after youve completed the sign-up process. If you do not sign up before the expiration date, you must request a new code. LoiLo Access Code: [email or web address] Expires: 2017  7:48 AM (This is the date your LoiLo access code will ) 4. Enter the last four digits of your Social Security Number (xxxx) and Date of Birth (mm/dd/yyyy) as indicated and click Submit. You will be taken to the next sign-up page. 5. Create a LoiLo ID. This will be your LoiLo login ID and cannot be changed, so think of one that is secure and easy to remember. 6. Create a LoiLo password. You can change your password at any time. 7. Enter your Password Reset Question and Answer. This can be used at a later time if you forget your password. 8. Enter your e-mail address. You will receive e-mail notification when new information is available in [address] 9. Click Sign Up. You can now view and download portions of your medical record. 10. Click the Download Summary menu link to download a portable copy of your medical information. Additional Information If you have questions, please visit the Frequently Asked Questions section of the LoiLo website at [email or web address] Remember, LoiLo is NOT to be used for urgent needs. For medical emergencies, dial 911. Discharge Orders None Introducing [facility]!    
 Nicolasa Marks introduces LoiLo patient portal. Now you can access parts of your medical record, email your doctor's office, and request medication refills online. 1. In your internet browser, go to [email or web address] 2. Click on the First Time User? Click Here link in the Sign In box. You will see the New Member Sign Up page. 3. Enter your Private Company Access Code exactly as it appears below. You will not need to use this code after youve completed the sign-up process. If you do not sign up before the expiration date, you must request a new code. · Private Company Access Code: [email or web address] Expires: 8/6/2017  7:48 AM 
 
4. Enter the last four digits of your Social Security Number (xxxx) and Date of Birth (mm/dd/yyyy) as indicated and click Submit. You will be taken to the next sign-up page. 5. Create a Private Company ID. This will be your Private Company login ID and cannot be changed, so think of one that is secure and easy to remember. 6. Create a Private Company password. You can change your password at any time. 7. Enter your Password Reset Question and Answer. This can be used at a later time if you forget your password. 8. Enter your e-mail address. You will receive e-mail notification when new information is available in [address] 9. Click Sign Up. You can now view and download portions of your medical record. 10. Click the Download Summary menu link to download a portable copy of your medical information. If you have questions, please visit the Frequently Asked Questions section of the Private Company website. Remember, Private Company is NOT to be used for urgent needs. For medical emergencies, dial 911. Now available from your iPhone and Android! General Information Please provide this summary of care documentation to your next provider. Patient Signature:  ____________________________________________________________ Date:  ____________________________________________________________  
  
Олег Breath  Provider Signature: ____________________________________________________________ Date:  ____________________________________________________________

## 2017-05-18 NOTE — PERIOP NOTES
TRANSFER - OUT REPORT:    Verbal report given to Teresita Melton on Alexa Carr  being transferred to [facility] 14 557 for routine post - op       Report consisted of patients Situation, Background, Assessment and   Recommendations(SBAR). Time Pre op antibiotic given:1137  Anesthesia Stop time: 6169  Rodríguez Present on Transfer to floor:yes  Order for Rodríguez on Chart:yes    Information from the following report(s) SBAR, OR Summary, Intake/Output and MAR was reviewed with the receiving nurse. Opportunity for questions and clarification was provided. Is the patient on 02? NO       Is the patient on a monitor? NO    Is the nurse transporting with the patient? NO    Surgical Waiting Area notified of patient's transfer from PACU? YES      The following personal items collected during your admission accompanied patient upon transfer:   Dental Appliance: Dental Appliances: None  Vision: Visual Aid: Glasses (to pacu in plastic bag)  Hearing Aid:    Jewelry: Jewelry: None  Clothing: Clothing: Other (comment) (street clothes to pacu in personal belongings bag)  Other Valuables:  Other Valuables: Eyeglasses (to pacu in plastic bag)  Valuables sent to safe:

## 2017-05-19 LAB
ANION GAP BLD CALC-SCNC: 9 MMOL/L (ref 5–15)
BUN SERPL-MCNC: 16 MG/DL (ref 6–20)
BUN/CREAT SERPL: 25 (ref 12–20)
CALCIUM SERPL-MCNC: 8.4 MG/DL (ref 8.5–10.1)
CHLORIDE SERPL-SCNC: 106 MMOL/L (ref 97–108)
CO2 SERPL-SCNC: 23 MMOL/L (ref 21–32)
CREAT SERPL-MCNC: 0.64 MG/DL (ref 0.55–1.02)
GLUCOSE SERPL-MCNC: 142 MG/DL (ref 65–100)
HGB BLD-MCNC: 11.6 G/DL (ref 11.5–16)
INR PPP: 1 (ref 0.9–1.1)
POTASSIUM SERPL-SCNC: 4.2 MMOL/L (ref 3.5–5.1)
PROTHROMBIN TIME: 9.9 SEC (ref 9–11.1)
SODIUM SERPL-SCNC: 138 MMOL/L (ref 136–145)

## 2017-05-19 PROCEDURE — 80048 BASIC METABOLIC PNL TOTAL CA: CPT | Performed by: ORTHOPAEDIC SURGERY

## 2017-05-19 PROCEDURE — 36415 COLL VENOUS BLD VENIPUNCTURE: CPT | Performed by: ORTHOPAEDIC SURGERY

## 2017-05-19 PROCEDURE — 97116 GAIT TRAINING THERAPY: CPT

## 2017-05-19 PROCEDURE — 65270000029 HC RM PRIVATE

## 2017-05-19 PROCEDURE — 85018 HEMOGLOBIN: CPT | Performed by: ORTHOPAEDIC SURGERY

## 2017-05-19 PROCEDURE — 97110 THERAPEUTIC EXERCISES: CPT

## 2017-05-19 PROCEDURE — 74011250637 HC RX REV CODE- 250/637: Performed by: NURSE PRACTITIONER

## 2017-05-19 PROCEDURE — 97161 PT EVAL LOW COMPLEX 20 MIN: CPT

## 2017-05-19 PROCEDURE — 85610 PROTHROMBIN TIME: CPT | Performed by: ORTHOPAEDIC SURGERY

## 2017-05-19 PROCEDURE — 74011250636 HC RX REV CODE- 250/636: Performed by: ORTHOPAEDIC SURGERY

## 2017-05-19 PROCEDURE — 74011250637 HC RX REV CODE- 250/637: Performed by: ORTHOPAEDIC SURGERY

## 2017-05-19 RX ORDER — WARFARIN 2.5 MG/1
2.5 TABLET ORAL DAILY
Qty: 60 TAB | Refills: 3 | Status: SHIPPED | OUTPATIENT
Start: 2017-05-19

## 2017-05-19 RX ORDER — ACETAMINOPHEN 325 MG/1
325 TABLET ORAL
Status: DISCONTINUED | OUTPATIENT
Start: 2017-05-19 | End: 2017-05-20 | Stop reason: HOSPADM

## 2017-05-19 RX ORDER — ACETAMINOPHEN 500 MG
500 TABLET ORAL EVERY 4 HOURS
Status: DISCONTINUED | OUTPATIENT
Start: 2017-05-19 | End: 2017-05-20 | Stop reason: HOSPADM

## 2017-05-19 RX ORDER — WARFARIN SODIUM 5 MG/1
5 TABLET ORAL ONCE
Status: COMPLETED | OUTPATIENT
Start: 2017-05-19 | End: 2017-05-19

## 2017-05-19 RX ORDER — ONDANSETRON 2 MG/ML
4 INJECTION INTRAMUSCULAR; INTRAVENOUS
Status: DISCONTINUED | OUTPATIENT
Start: 2017-05-19 | End: 2017-05-20 | Stop reason: HOSPADM

## 2017-05-19 RX ORDER — OXYCODONE HYDROCHLORIDE 5 MG/1
10 TABLET ORAL
Qty: 60 TAB | Refills: 0 | Status: SHIPPED | OUTPATIENT
Start: 2017-05-19

## 2017-05-19 RX ADMIN — DOCUSATE SODIUM AND SENNOSIDES 1 TABLET: 8.6; 5 TABLET, FILM COATED ORAL at 08:23

## 2017-05-19 RX ADMIN — ACETAMINOPHEN 500 MG: 500 TABLET, FILM COATED ORAL at 23:53

## 2017-05-19 RX ADMIN — HYDROMORPHONE HYDROCHLORIDE 0.5 MG: 1 INJECTION, SOLUTION INTRAMUSCULAR; INTRAVENOUS; SUBCUTANEOUS at 04:20

## 2017-05-19 RX ADMIN — VITAMIN D, TAB 1000IU (100/BT) 2000 UNITS: 25 TAB at 06:34

## 2017-05-19 RX ADMIN — POLYETHYLENE GLYCOL 3350 17 G: 17 POWDER, FOR SOLUTION ORAL at 08:23

## 2017-05-19 RX ADMIN — DIAZEPAM 2 MG: 2 TABLET ORAL at 19:02

## 2017-05-19 RX ADMIN — OXYCODONE HYDROCHLORIDE 10 MG: 5 TABLET ORAL at 21:01

## 2017-05-19 RX ADMIN — ACETAMINOPHEN 500 MG: 500 TABLET, FILM COATED ORAL at 11:41

## 2017-05-19 RX ADMIN — ACETAMINOPHEN 500 MG: 500 TABLET, FILM COATED ORAL at 19:04

## 2017-05-19 RX ADMIN — OXYCODONE HYDROCHLORIDE 5 MG: 5 TABLET ORAL at 11:40

## 2017-05-19 RX ADMIN — DOCUSATE SODIUM AND SENNOSIDES 1 TABLET: 8.6; 5 TABLET, FILM COATED ORAL at 17:02

## 2017-05-19 RX ADMIN — WARFARIN SODIUM 5 MG: 5 TABLET ORAL at 11:41

## 2017-05-19 RX ADMIN — ACETAMINOPHEN 500 MG: 500 TABLET, FILM COATED ORAL at 17:02

## 2017-05-19 RX ADMIN — Medication 10 ML: at 14:00

## 2017-05-19 RX ADMIN — Medication 10 ML: at 06:15

## 2017-05-19 RX ADMIN — OXYCODONE HYDROCHLORIDE 10 MG: 5 TABLET ORAL at 14:51

## 2017-05-19 RX ADMIN — OXYCODONE HYDROCHLORIDE 5 MG: 5 TABLET ORAL at 02:40

## 2017-05-19 RX ADMIN — CEFAZOLIN 2 G: 1 INJECTION, POWDER, FOR SOLUTION INTRAMUSCULAR; INTRAVENOUS; PARENTERAL at 03:50

## 2017-05-19 RX ADMIN — Medication 10 ML: at 04:21

## 2017-05-19 RX ADMIN — OXYCODONE HYDROCHLORIDE 10 MG: 5 TABLET ORAL at 23:53

## 2017-05-19 RX ADMIN — KETOROLAC TROMETHAMINE 15 MG: 30 INJECTION, SOLUTION INTRAMUSCULAR at 11:40

## 2017-05-19 RX ADMIN — OXYCODONE HYDROCHLORIDE 5 MG: 5 TABLET ORAL at 08:23

## 2017-05-19 RX ADMIN — OXYCODONE HYDROCHLORIDE 10 MG: 5 TABLET ORAL at 18:06

## 2017-05-19 RX ADMIN — KETOROLAC TROMETHAMINE 15 MG: 30 INJECTION, SOLUTION INTRAMUSCULAR at 06:14

## 2017-05-19 NOTE — PROGRESS NOTES
..Bedside and Verbal shift change report given to Mariana Avendano (oncoming nurse) by Jillian Andrew (offgoing nurse). Report included the following information SBAR.

## 2017-05-19 NOTE — PROGRESS NOTES
Care Management-Received consult to arrange home health PT. Patient admitted for left TKR. She is here under worker's compensation. Spoke with patient and her mother-Neena Shrestha in the room. Patient lives alone and is independent with her ADLs. Confirmed and updated information on the face sheet. Patient will be going to stay with her mother at discharge. Mariano Chaudhry home phone is [phone]. Her address is [address] [place] [address]. Her sister will transport her home. Her  at [employer] is [employer]. Her direct phone number is [phone] and fax is [phone]. The main number to [employer] is [phone]. Her claim number is [id number]. Per [employer], Ms. Jonny Obregon is out of the office until May 22, 2017. This CM called her supervisor-Kahlil Cerda ([phone]) and left a message. Per [employer], his e-mail is [email or web address]. Offered freedom of choice for home health. Patient does not have a preference. She is okay with whichever [address] [place] [address] contracts with. Patient also asked about rolling walker. CM will try to obtain authorization for this at same time. Received call back from Diandra Thmoas ([phone])  from [employer]. She will be there 8:00 AM to 4:30 PM. She said the claim number will be the authorization number. They use [employer] agencies and will arrange the service and equipment with a local agency. They use [employer] ([phone]) for medical equipment. They use [employer] ([phone]). This CM gave the DME information to the physical therapist, who will arrange the walker. Called [facility] and spoke with Arpan at 11:45 AM . Gave her the information she requested. Since plan is for discharge today, she will place the order as stat. This means the coordinator will call back within 2 hours. She also asked the date of injury and where it happened. The coordinator will need this information when they call back. Await call. Updated patient. Injury happened in [place], [facility]. Patient did not know exact date of injury. Patient said depending on how well her pain is controlled and how she does with stairs, she may be discharged tomorrow. Left message for arturo Pavoner at [facility] regarding date of injury. Await calls form [facility]  and from Teresita Melton. OLGA Harry     12:52 Addendum: Received call from Rachael Handy,  with [facility]. Her direct number is [phone] ext. [phone]. Her direct fax number is [phone]. Faxed H&P, face sheet, labs, and order to her. Patient's ID # [id number]. She will work on locating an agency. OLGA Harry     15:54 Addendum: Received call back from Rachael Handy with [facility]. She has had difficulty finding an agency to provide the services. She could not find anyone willing to do an individual contract with them. She has contacted an agency called [facility] that works through [employer]. Their intake number is [phone]. She said they will likely assign it through the local [address] office. She did not have that number yet. She is going to ask [facility] if they are able to do the nursing piece of the home health. CM still awaiting her call back. Report to Av Sweet RN-CRM who will update chart and AVS if Ms. Hamilton calls back. The main number to [facility] is above. CM will need to call that number on Saturday and use the above ID# as reference, if we do not hear back tonight.     OLGA Harry

## 2017-05-19 NOTE — PROGRESS NOTES
Received call from Rachael Handy,  with [facility]. Her direct number is [phone] ext. [phone]. Her direct fax number is [phone]. Faxed H&P, face sheet, labs, and order to her. Patient's ID # [id number]. She will work on locating an agency. [facility]) [facility] [phone] will start PT/OT Mon or Tues. [facility] will be doing Skilled Nursing [phone]    I called the floor and reported this to RN.  Placed information in AVS.    Av Sweet RN CRM

## 2017-05-19 NOTE — PROGRESS NOTES
Problem: Mobility Impaired (Adult and Pediatric)  Goal: *Acute Goals and Plan of Care (Insert Text)  Physical Therapy Goals  Initiated 5/19/2017    1. Patient will move from supine to sit and sit to supine in bed with modified independence within 4 days. 2. Patient will perform sit to stand with modified independence within 4 days. 3. Patient will ambulate with modified independence for 300 feet with the least restrictive device within 4 days. 4. Patient will ascend/descend 4 stairs with 1 handrail(s) with modified independence within 4 days. 5. Patient will perform home exercise program per protocol with modified independence within 4 days. 6. Patient will demonstrate AROM 0-90 degrees in operative joint within 4 days. PHYSICAL THERAPY TREATMENT  Patient: Román Chu (13 y.o. female)  Date: 5/19/2017  Diagnosis: OSTEOARTHRITIS LEFT KNEE  OA (osteoarthritis) of knee <principal problem not specified>  Procedure(s) (LRB):  LEFT TOTAL KNEE ARTHROPLASTY (Left) 1 Day Post-Op  Precautions:        ASSESSMENT:  Patient progressing her mobility, but continues to have considerable edema and is somewhat impulsive with her mobility. She needed reminders to slow her pace and avoid scooting around on her RLE while avoiding weight on the LLE. She did perform stairs with 1 railing and cane without difficulty. Reviewed exercises and assisted with stretching the L knee. Ice applied after session. Recommend D/C home tomorrow after therapy session and that she avoid sitting up in the chair tonight to attempt to decrease some of the edema in the LLE. Progression toward goals:  [X]      Improving appropriately and progressing toward goals  [ ]      Improving slowly and progressing toward goals  [ ]      Not making progress toward goals and plan of care will be adjusted       PLAN:  Patient continues to benefit from skilled intervention to address the above impairments.   Continue treatment per established plan of care.  Discharge Recommendations:  Home Health  Further Equipment Recommendations for Discharge:  Hansea Farhatgo has been ordered       SUBJECTIVE:   I don't know if I should go home or not      OBJECTIVE DATA SUMMARY:   Range of Motion:  AROM: Within functional limits                    LLE PROM  L Knee Flexion: 90  L Knee Extension: -15  Functional Mobility Training:  Bed Mobility:  Rolling: Modified independent  Supine to Sit: Modified independent  Sit to Supine: Modified independent           Transfers:  Sit to Stand: Supervision; Additional time; Adaptive equipment  Stand to Sit: Supervision; Additional time; Adaptive equipment                             Ambulation/Gait Training:  Distance (ft): 250 Feet (ft)  Assistive Device: Gait belt;Walker, rolling  Ambulation - Level of Assistance: Contact guard assistance; Additional time; Adaptive equipment     Gait Description (WDL): Exceptions to WDL  Gait Abnormalities: Antalgic;Decreased step clearance  Right Side Weight Bearing: Full  Left Side Weight Bearing: As tolerated  Base of Support: Shift to right  Stance: Left decreased  Speed/Karina: Slow  Step Length: Right shortened;Left shortened        Interventions: Safety awareness training; Tactile cues; Verbal cues; Visual/Demos                    Gait characterized by poor knee extension in stance on the LLE, decreased extension compared to earlier today  Stairs:  Number of Stairs Trained: 8  Stairs - Level of Assistance: Stand-by asssistance  Rail Use: Right   Therapeutic Exercises:   Exercises performed per protocol. See morning treatment note for description. Pain:  Pain Scale 1: Numeric (0 - 10)  Pain Intensity 1: 8  Pain Location 1: Knee  Pain Orientation 1: Left  Pain Description 1: Aching  Pain Intervention(s) 1: Medication (see MAR)  Activity Tolerance:   Good  Please refer to the flowsheet for vital signs taken during this treatment.   After treatment:   [ ] Patient left in no apparent distress sitting up in chair  [X] Patient left in no apparent distress in bed, ice in place  [X] Call bell left within reach  [X] Nursing notified  [ ] Caregiver present  [ ] Bed alarm activated      COMMUNICATION/COLLABORATION:   The patients plan of care was discussed with: Registered Nurse     Aaron Vargas PT   Time Calculation: 25 mins

## 2017-05-19 NOTE — ANESTHESIA POSTPROCEDURE EVALUATION
Post-Anesthesia Evaluation and Assessment    Patient: Tomas Rome MRN: [id number]  SSN: [id number]    YOB: 1955  Age: 65 y.o. Sex: female       Cardiovascular Function/Vital Signs  Visit Vitals    /75    Pulse 86    Temp 36.4 °C (97.5 °F)    Resp 16    Ht 5' 3\" (1.6 m)    Wt 74.8 kg (165 lb)    SpO2 97%    BMI 29.23 kg/m2       Patient is status post general anesthesia for Procedure(s):  LEFT TOTAL KNEE ARTHROPLASTY. Nausea/Vomiting: None    Postoperative hydration reviewed and adequate. Pain:  Pain Scale 1: Numeric (0 - 10) (05/19/17 0420)  Pain Intensity 1: 5 (pt request IV pain meds at this time) (05/19/17 0420)   Managed    Neurological Status:   Neuro (WDL): Within Defined Limits (05/18/17 0835)  Neuro  Neurologic State: (P) Alert (05/19/17 0351)  Orientation Level: (P) Oriented X4 (05/19/17 0351)  LUE Motor Response: Purposeful (05/18/17 1634)  LLE Motor Response: Purposeful (05/18/17 1634)  RUE Motor Response: Purposeful (05/18/17 1634)  RLE Motor Response: Purposeful (05/18/17 1634)   At baseline    Mental Status and Level of Consciousness: Arousable    Pulmonary Status:   O2 Device: Room air (05/18/17 2018)   Adequate oxygenation and airway patent    Complications related to anesthesia: None    Post-anesthesia assessment completed.  No concerns    Signed By: Jose Martin Sims MD     May 19, 2017

## 2017-05-19 NOTE — PROGRESS NOTES
POD 1 Day Post-Op    Procedure:  Procedure(s):  LEFT TOTAL KNEE ARTHROPLASTY    Subjective:     Patient doing well. Pain is controlled. Objective:     Blood pressure 131/75, pulse 86, temperature 97.5 °F (36.4 °C), resp. rate 16, height 5' 3\" (1.6 m), weight 74.8 kg (165 lb), SpO2 97 %. Temp (24hrs), Av.9 °F (36.6 °C), Min:97.4 °F (36.3 °C), Max:98.3 °F (36.8 °C)      Physical Exam:  Examination of the left knee reveals that the incision is clean, dry and intact. Sensation is intact to light touch.  mild swelling. no calf pain.   NVI    Labs:   Lab Results   Component Value Date/Time    HGB 11.6 2017 03:54 AM         Assessment:     Active Problems:    OA (osteoarthritis) of knee (2017)        Plan/Recommendations/Medical Decision Making:     Continue physical therapy  Ice and elevate  Continue coumadin for DVT prophylaxis

## 2017-05-19 NOTE — PROGRESS NOTES
Bedside and Verbal shift change report given to melida (oncoming nurse) by Tavares Bhandari (offgoing nurse). Report included the following information SBAR, Kardex, Procedure Summary, Intake/Output, MAR and Recent Results.

## 2017-05-19 NOTE — PROGRESS NOTES
Problem: Mobility Impaired (Adult and Pediatric)  Goal: *Acute Goals and Plan of Care (Insert Text)  Physical Therapy Goals  Initiated 5/19/2017    1. Patient will move from supine to sit and sit to supine in bed with modified independence within 4 days. 2. Patient will perform sit to stand with modified independence within 4 days. 3. Patient will ambulate with modified independence for 300 feet with the least restrictive device within 4 days. 4. Patient will ascend/descend 4 stairs with 1 handrail(s) with modified independence within 4 days. 5. Patient will perform home exercise program per protocol with modified independence within 4 days. 6. Patient will demonstrate AROM 0-90 degrees in operative joint within 4 days. PHYSICAL THERAPY KNEE EVALUATION  Patient: Cory Saab (69 y.o. female)  Date: 5/19/2017  Primary Diagnosis: OSTEOARTHRITIS LEFT KNEE  OA (osteoarthritis) of knee  Procedure(s) (LRB):  LEFT TOTAL KNEE ARTHROPLASTY (Left) 1 Day Post-Op   Precautions: L WBAT         ASSESSMENT :  Based on the objective data described below, the patient presents with decreased mobility and ROM s/p L TKA POD1. She had already been up OOB and to the bathroom with nursing on arrival and had been in the chair some this morning, as well. Her gait was stable with the walker, but slow and methodical as would be expected. She will need to manage 4 steps to enter with 1 railing prior to D/C. Her ROM is her greatest challenge at this point. She was able to achieve good flexion to approx 80 but her extension was limited to -10 with assistance. Encouraged positioning to facilitate knee extension and ice and limiting her time in the chair in flexion. Reviewed and performed all exercises, as well. Will follow up this afternoon for stair training and possibly clear for D/C. Pain is the other concern, as she was in tears at the end of her therapy session.      Patient will benefit from skilled intervention to address the above impairments. Patients rehabilitation potential is considered to be Excellent  Factors which may influence rehabilitation potential include:   [ ]         None noted  [ ]         Mental ability/status  [ ]         Medical condition  [ ]         Home/family situation and support systems  [ ]         Safety awareness  [X]         Pain tolerance/management  [ ]         Other:        PLAN :  Recommendations and Planned Interventions:  [X]           Bed Mobility Training             [ ]    Neuromuscular Re-Education  [X]           Transfer Training                   [ ]    Orthotic/Prosthetic Training  [X]           Gait Training                         [ ]    Modalities  [X]           Therapeutic Exercises           [ ]    Edema Management/Control  [X]           Therapeutic Activities            [X]    Patient and Family Training/Education  [ ]           Other (comment):     Frequency/Duration: Patient will be followed by physical therapy twice daily to address goals. Discharge Recommendations: Home Health  Further Equipment Recommendations for Discharge: rolling walker and has been ordered       SUBJECTIVE:   Patient stated Yay, the final piece to go home.       OBJECTIVE DATA SUMMARY:   HISTORY:    Past Medical History:   Diagnosis Date    Arthritis       OSTEO    Ill-defined condition       SINUSSITIS, & BRONCHITIS FREQUENTLY     Past Surgical History:   Procedure Laterality Date    HX GI         COLONOSCOPY     HX HEENT         WISDOM TEETH EXTRACTION X 4    HX ORTHOPAEDIC         LEFT KNEE ARTHROSCOPY.      Prior Level of Function/Home Situation: independent with mobility, had difficulty with knee extension prior to surgery  Personal factors and/or comorbidities impacting plan of care: L TKA     Home Situation  Home Environment: Private residence (going to stay with mom 1 week postop)  # Steps to Enter: 4  One/Two Story Residence: One story  Living Alone: Yes (going to stay with mom 1 week postop)  Support Systems: Family member(s)  Patient Expects to be Discharged to[de-identified] Private residence  Current DME Used/Available at Home: Kasandra Bautista, anastacio, Walker     EXAMINATION/PRESENTATION/DECISION MAKING:   Critical Behavior:  Neurologic State: Alert, Appropriate for age  Orientation Level: Oriented X4        Hearing: Auditory  Auditory Impairment: None  Skin:  Edema noted, dressing is in place  Range Of Motion:  AROM: Within functional limits                    LLE PROM  L Knee Flexion: 80  L Knee Extension: -10  Strength:    Strength: Within functional limits                    Tone & Sensation:   Tone: Normal              Sensation: Intact               Coordination:  Coordination: Within functional limits  Vision:      Functional Mobility:  Bed Mobility:  Rolling: Modified independent  Supine to Sit: Modified independent  Sit to Supine: Modified independent     Transfers:  Sit to Stand: Supervision; Additional time; Adaptive equipment  Stand to Sit: Supervision; Additional time; Adaptive equipment                       Balance:   Sitting: Intact; Without support  Standing: Impaired; With support (hands on the walker)  Standing - Static: Good  Standing - Dynamic : Fair  Ambulation/Gait Training:  Distance (ft): 250 Feet (ft)  Assistive Device: Gait belt;Walker, rolling  Ambulation - Level of Assistance: Contact guard assistance; Additional time; Adaptive equipment     Gait Description (WDL): Exceptions to WDL  Gait Abnormalities: Antalgic;Decreased step clearance  Right Side Weight Bearing: Full  Left Side Weight Bearing: As tolerated  Base of Support: Shift to right  Stance: Left decreased  Speed/Karina: Slow  Step Length: Right shortened;Left shortened        Interventions: Safety awareness training; Tactile cues; Verbal cues; Visual/Demos                    Started with step to and progressed to a continuous, step through gait.   She needed cues to shorten her stride some to increase safety              Stairs: Therapeutic Exercises:   TKA post op exercises     Functional Measure:  Tinetti test:      Sitting Balance: 1  Arises: 1  Attempts to Rise: 1  Immediate Standing Balance: 1  Standing Balance: 1  Nudged: 2  Eyes Closed: 1  Turn 360 Degrees - Continuous/Discontinuous: 1  Turn 360 Degrees - Steady/Unsteady: 1  Sitting Down: 1  Balance Score: 11  Indication of Gait: 0  R Step Length/Height: 1  L Step Length/Height: 1  R Foot Clearance: 1  L Foot Clearance: 1  Step Symmetry: 1  Step Continuity: 1  Path: 1  Trunk: 0  Walking Time: 0  Gait Score: 7  Total Score: 18         Tinetti Test and G-code impairment scale:  Percentage of Impairment CH     0%    CI     1-19% CJ     20-39% CK     40-59% CL     60-79% CM     80-99% CN      100%   Tinetti  Score 0-28 28 23-27 17-22 12-16 6-11 1-5 0          Tinetti Tool Score Risk of Falls  <19 = High Fall Risk  19-24 = Moderate Fall Risk  25-28 = Low Fall Risk  Tinetti ME. Performance-Oriented Assessment of Mobility Problems in Elderly Patients. Holman 67; [id number].  (Scoring Description: PT Bulletin Feb. 10, 1993)     Older adults: Lella Brittle et al, 2009; n = 1000 [facility] elderly evaluated with ABC, STACEY, ADL, and IADL)  · Mean STACEY score for males aged 69-68 years = 26.21(3.40)  · Mean STACEY score for females age 69-68 years = 25.16(4.30)  · Mean STACEY score for males over 80 years = 23.29(6.02)  · Mean STACEY score for females over 80 years = 17.20(8.32)               Physical Therapy Evaluation Charge Determination   History Examination Presentation Decision-Making   MEDIUM  Complexity : 1-2 comorbidities / personal factors will impact the outcome/ POC  LOW Complexity : 1-2 Standardized tests and measures addressing body structure, function, activity limitation and / or participation in recreation  LOW Complexity : Stable, uncomplicated  LOW Complexity : FOTO score of       Based on the above components, the patient evaluation is determined to be of the following complexity level: LOW      Pain:  Pain Scale 1: Numeric (0 - 10)  Pain Intensity 1: 6  Pain Location 1: Knee  Pain Orientation 1: Left  Pain Description 1: Aching  Pain Intervention(s) 1: Medication (see MAR)  Activity Tolerance:   Good, limited primarily by pain  Please refer to the flowsheet for vital signs taken during this treatment. After treatment:   [ ]         Patient left in no apparent distress sitting up in chair  [X]         Patient left in no apparent distress in bed, ice in place  [X]         Call bell left within reach  [X]         Nursing notified  [X]         Caregiver present, mother  [ ]         Bed alarm activated      COMMUNICATION/EDUCATION:   The patients plan of care was discussed with: Registered Nurse.  [X]         Fall prevention education was provided and the patient/caregiver indicated understanding. [X]         Patient/family have participated as able in goal setting and plan of care. [X]         Patient/family agree to work toward stated goals and plan of care. [ ]         Patient understands intent and goals of therapy, but is neutral about his/her participation. [ ]         Patient is unable to participate in goal setting and plan of care.      Thank you for this referral.  Cecil Molina, PT   Time Calculation: 37 mins

## 2017-05-19 NOTE — DISCHARGE INSTRUCTIONS
[address] [facility]. Total Knee Replacement  Post-Op Discharge Instructions  Dr. Lokesh Pineda a regular diet. Drink plenty of fluids. Eat foods high in fiber and protein and low in fat. Avoid alcoholic beverages. Avoid smoking. Activities  You are going home a well person, so be as active as possible. Walk with your walker or crutches. Continue using your walker or crutches until seen for your follow-up visit. Avoid low chairs and slippery surfaces. Avoid twisting your knee. Do not sit longer than 45 minutes at a time. During the daytime, get up every hour and take a brief walk. Exercises  Perform your exercise routine 3 times a day as instructed by the physical therapist.  Gradually increase the repetitions of the exercises. You may place an icebag on your knee for 5-10 minutes after exercising. Please place a cloth between the ice and skin. You should walk daily, each time lengthening your walking distance as your strength improves. Medications  Take Coumadin daily to prevent blood clots. Do not take aspirin or anti-inflammatory medicines while you are taking Coumadin. Take a multivitamin with iron once a day for a month. You may need to take a laxative or stool-softener if you experience constipation. You will be given a prescription for roxicodone for pain. Take as directed. Take pain medication with food. You will find that you will decrease the amount you use as your pain lessens. Incision Care  You will have Steri strips (tapes) on your knee incision. Do not remove them. They will come off on their own. You may take a shower when your incision is dry, generally about a week after surgery. It is OK to let the water run over your incision, but do not soak the knee in water. You should keep a dressing on your wound if there is any drainage; change daily. Wear your elastic stockings for 4 weeks.   You may remove them for approximately 1 hour when showering/sponge-bathing. Do not shower if the wound has drainage. Follow-up office visit  See Dr. Janes Benson in 2-3 weeks. Call to make an appointment:  [phone]. Reasons to call the doctor  1. Increased redness, swelling, or drainage from your incision site. 2.  Temperature consistently greater than 102 degrees. 3.  Increased pain or unrelieved pain. 4.  Calf or chest pain. Home Health  Physical therapy - routine exercises, transfers, gait training, active straight leg raises, 3 times a week for 3 weeks. Home nursing - draw a pro-time every Wednesday for 3 weeks. Fax the results to Dr. Janes Benson at [phone]. Call abnormal results to [phone]. Other Instructions  Refer to recovering at home, page 41, of your patient handbook for more instructions. [employer] Activation    Thank you for requesting access to [employer]. Please follow the instructions below to securely access and download your online medical record. [employer] allows you to send messages to your doctor, view your test results, renew your prescriptions, schedule appointments, and more. How Do I Sign Up? 1. In your internet browser, go to [email or web address]  2. Click on the First Time User? Click Here link in the Sign In box. You will be redirect to the New Member Sign Up page. 3. Enter your [employer] Access Code exactly as it appears below. You will not need to use this code after youve completed the sign-up process. If you do not sign up before the expiration date, you must request a new code. Telepath Access Code: [email or web address]  Expires: 2017  7:48 AM (This is the date your Telepath access code will )    4. Enter the last four digits of your Social Security Number (xxxx) and Date of Birth (mm/dd/yyyy) as indicated and click Submit. You will be taken to the next sign-up page. 5. Create a [facility]path ID.  This will be your Telepath login ID and cannot be changed, so think of one that is secure and easy to remember. 6. Create a [employer] password. You can change your password at any time. 7. Enter your Password Reset Question and Answer. This can be used at a later time if you forget your password. 8. Enter your e-mail address. You will receive e-mail notification when new information is available in [address] 9. Click Sign Up. You can now view and download portions of your medical record. 10. Click the Download Summary menu link to download a portable copy of your medical information. Additional Information    If you have questions, please visit the Frequently Asked Questions section of the [employer] website at [email or web address] Remember, [employer] is NOT to be used for urgent needs. For medical emergencies, dial 911.

## 2017-05-19 NOTE — PROGRESS NOTES
Pharmacist Note  Warfarin Dosing  Consult provided for this 65 y.o. female to manage warfarin for Orthopedic Surgery (VTE prophylaxis)    INR Goal: 1.7- 2.2    Therapy Day: 2    Drugs that may increase INR:None  Drugs that may decrease INR: None  Other current anticoagulants/ drugs that may increase bleeding risk: NSAID  Risk factors: None  Daily INR ordered: YES    Recent Labs      05/19/17   0354  05/18/17   1701   HGB  11.6   --    INR  1.0  1.0     Date               INR                 Dose  5/18                1.0                   5mg  5/19  1.0  5mg             Assessment/ Plan: Will order warfarin 5 mg PO x 1 dose. Pharmacy will continue to monitor daily and adjust therapy as indicated. Please contact the pharmacist at  or  for discharge recommendations if needed.

## 2017-05-20 VITALS
OXYGEN SATURATION: 95 % | BODY MASS INDEX: 29.23 KG/M2 | SYSTOLIC BLOOD PRESSURE: 149 MMHG | DIASTOLIC BLOOD PRESSURE: 90 MMHG | HEART RATE: 90 BPM | WEIGHT: 165 LBS | RESPIRATION RATE: 16 BRPM | HEIGHT: 63 IN | TEMPERATURE: 98.3 F

## 2017-05-20 LAB
HGB BLD-MCNC: 10.2 G/DL (ref 11.5–16)
INR PPP: 1.7 (ref 0.9–1.1)
PROTHROMBIN TIME: 17 SEC (ref 9–11.1)

## 2017-05-20 PROCEDURE — 85610 PROTHROMBIN TIME: CPT | Performed by: ORTHOPAEDIC SURGERY

## 2017-05-20 PROCEDURE — 97116 GAIT TRAINING THERAPY: CPT

## 2017-05-20 PROCEDURE — 74011250637 HC RX REV CODE- 250/637: Performed by: ORTHOPAEDIC SURGERY

## 2017-05-20 PROCEDURE — 36415 COLL VENOUS BLD VENIPUNCTURE: CPT | Performed by: ORTHOPAEDIC SURGERY

## 2017-05-20 PROCEDURE — 85018 HEMOGLOBIN: CPT | Performed by: ORTHOPAEDIC SURGERY

## 2017-05-20 PROCEDURE — 97110 THERAPEUTIC EXERCISES: CPT

## 2017-05-20 PROCEDURE — 74011250637 HC RX REV CODE- 250/637: Performed by: NURSE PRACTITIONER

## 2017-05-20 RX ORDER — WARFARIN 1 MG/1
1 TABLET ORAL ONCE
Status: COMPLETED | OUTPATIENT
Start: 2017-05-20 | End: 2017-05-20

## 2017-05-20 RX ORDER — ONDANSETRON 4 MG/1
4 TABLET, ORALLY DISINTEGRATING ORAL
Qty: 30 TAB | Refills: 0 | Status: SHIPPED | OUTPATIENT
Start: 2017-05-20

## 2017-05-20 RX ORDER — OXYCODONE HYDROCHLORIDE 5 MG/1
5 TABLET ORAL
Qty: 20 TAB | Refills: 0 | Status: SHIPPED | OUTPATIENT
Start: 2017-05-20

## 2017-05-20 RX ADMIN — OXYCODONE HYDROCHLORIDE 10 MG: 5 TABLET ORAL at 06:15

## 2017-05-20 RX ADMIN — ACETAMINOPHEN 500 MG: 500 TABLET, FILM COATED ORAL at 03:02

## 2017-05-20 RX ADMIN — OXYCODONE HYDROCHLORIDE 5 MG: 5 TABLET ORAL at 09:07

## 2017-05-20 RX ADMIN — DOCUSATE SODIUM AND SENNOSIDES 1 TABLET: 8.6; 5 TABLET, FILM COATED ORAL at 09:04

## 2017-05-20 RX ADMIN — OXYCODONE HYDROCHLORIDE 10 MG: 5 TABLET ORAL at 03:02

## 2017-05-20 RX ADMIN — POLYETHYLENE GLYCOL 3350 17 G: 17 POWDER, FOR SOLUTION ORAL at 09:04

## 2017-05-20 RX ADMIN — WARFARIN SODIUM 1 MG: 1 TABLET ORAL at 11:00

## 2017-05-20 RX ADMIN — DEXTROAMPHETAMINE SACCHARATE, AMPHETAMINE ASPARTATE, DEXTROAMPHETAMINE SULFATE AND AMPHETAMINE SULFATE 30 MG: 2.5; 2.5; 2.5; 2.5 TABLET ORAL at 07:07

## 2017-05-20 RX ADMIN — VITAMIN D, TAB 1000IU (100/BT) 2000 UNITS: 25 TAB at 07:07

## 2017-05-20 RX ADMIN — ACETAMINOPHEN 500 MG: 500 TABLET, FILM COATED ORAL at 07:07

## 2017-05-20 NOTE — PROGRESS NOTES
Orthopedic Joint Progress Note    May 20, 2017  Admit Date: 2017  Admit Diagnosis: OSTEOARTHRITIS LEFT KNEE  OA (osteoarthritis) of knee    2 Days Post-Op    Subjective:     Shefali Lucia pain more controlled today, just worked with therapy    Review of Systems: Pertinent items are noted in HPI. Objective:     PT/OT:     PATIENT MOBILITY    Bed Mobility  Rolling: Modified independent  Supine to Sit: Modified independent  Sit to Supine: Modified independent  Transfers  Sit to Stand: Supervision, Additional time, Adaptive equipment  Stand to Sit: Supervision, Additional time, Adaptive equipment      Gait  Base of Support: Shift to right  Speed/Karina: Slow  Step Length: Right shortened, Left shortened  Stance: Left decreased  Gait Abnormalities: Antalgic, Decreased step clearance  Ambulation - Level of Assistance: Contact guard assistance, Additional time, Adaptive equipment  Distance (ft): 250 Feet (ft)  Assistive Device: Gait belt, Walker, rolling  Rail Use: Right   Stairs - Level of Assistance: Stand-by asssistance  Number of Stairs Trained: 8  Interventions: Safety awareness training, Tactile cues, Verbal cues, Visual/Demos   Weight Bearing Status  Right Side Weight Bearing: Full  Left Side Weight Bearing: As tolerated        Vital Signs:    Blood pressure 149/90, pulse 95, temperature 98.3 °F (36.8 °C), resp. rate 16, height 5' 3\" (1.6 m), weight 74.8 kg (165 lb), SpO2 95 %.   Temp (24hrs), Av.1 °F (36.7 °C), Min:97.5 °F (36.4 °C), Max:98.3 °F (36.8 °C)      Pain Control:   Pain Assessment  Pain Scale 1: Numeric (0 - 10)  Pain Intensity 1: 5  Pain Onset 1: acute  Pain Location 1: Knee  Pain Orientation 1: Left  Pain Description 1: Aching  Pain Intervention(s) 1: Medication (see MAR)    Meds:  Current Facility-Administered Medications   Medication Dose Route Frequency    warfarin (COUMADIN) tablet 1 mg  1 mg Oral ONCE    ondansetron (ZOFRAN) injection 4 mg  4 mg IntraVENous Q4H PRN    acetaminophen (TYLENOL) tablet 500 mg  500 mg Oral Q4H    acetaminophen (TYLENOL) tablet 325 mg  325 mg Oral Q6H PRN    cholecalciferol (VITAMIN D3) tablet 2,000 Units  2,000 Units Oral ACB    dextroamphetamine-amphetamine (ADDERALL) tablet 30 mg  30 mg Oral ACB    diazePAM (VALIUM) tablet 5 mg  5 mg Oral Q8H PRN    sodium chloride (NS) flush 5-10 mL  5-10 mL IntraVENous Q8H    sodium chloride (NS) flush 5-10 mL  5-10 mL IntraVENous PRN    naloxone (NARCAN) injection 0.4 mg  0.4 mg IntraVENous PRN    senna-docusate (PERICOLACE) 8.6-50 mg per tablet 1 Tab  1 Tab Oral BID    polyethylene glycol (MIRALAX) packet 17 g  17 g Oral DAILY    bisacodyl (DULCOLAX) suppository 10 mg  10 mg Rectal DAILY PRN    oxyCODONE IR (ROXICODONE) tablet 5 mg  5 mg Oral Q3H PRN    oxyCODONE IR (ROXICODONE) tablet 10 mg  10 mg Oral Q3H PRN    Warfarin Pharmacy Dosing   Other PRN        LAB:    Lab Results   Component Value Date/Time    INR 1.7 05/20/2017 03:07 AM    INR 1.0 05/19/2017 03:54 AM    INR 1.0 05/18/2017 05:01 PM     Lab Results   Component Value Date/Time    HGB 10.2 05/20/2017 03:07 AM    HGB 11.6 05/19/2017 03:54 AM       Wound Knee Left (Active)   DRESSING STATUS Clean, dry, and intact 5/19/2017  8:08 PM   DRESSING TYPE ABD pad;Special tape (comment) 5/19/2017  8:08 PM   Incision site well approximated? Yes 5/19/2017  6:30 AM   Drainage Amount  None 5/19/2017  8:08 PM   Drainage Color Serosanguinous 5/19/2017  6:30 AM   Wound Odor None 5/19/2017  8:08 PM   Periwound Skin Condition Intact 5/19/2017  6:30 AM   Dressing Type Applied Ice wound dressing/Cryotherapy 5/19/2017  9:26 AM   Number of days:2             Physical Exam:  Musculoskeletal: LLE: dressing intact. no calf tenderness. sensation and motor intact.     Assessment:      Active Problems:    OA (osteoarthritis) of knee (5/18/2017)         Plan:     Continue PT/OT/Rehab  Consult: Rehab team including PT, OT, recreational therapy, and     Patient Expects to be Discharged to[de-identified] Private residence  PT/OT  Expect discharge later today  Pain control  DV T pprx  Signed By: Prachi Sin MD

## 2017-05-20 NOTE — PROGRESS NOTES
Patient seen this morning for PT. At this time, from a PT standpoint, patient is cleared or DC to home with [facility] PT. Full note to follow.

## 2017-05-20 NOTE — PROGRESS NOTES
Problem: Mobility Impaired (Adult and Pediatric)  Goal: *Acute Goals and Plan of Care (Insert Text)  Physical Therapy Goals  Initiated 5/19/2017    1. Patient will move from supine to sit and sit to supine in bed with modified independence within 4 days. 2. Patient will perform sit to stand with modified independence within 4 days. 3. Patient will ambulate with modified independence for 300 feet with the least restrictive device within 4 days. 4. Patient will ascend/descend 4 stairs with 1 handrail(s) with modified independence within 4 days. 5. Patient will perform home exercise program per protocol with modified independence within 4 days. 6. Patient will demonstrate AROM 0-90 degrees in operative joint within 4 days. PHYSICAL THERAPY TREATMENT/DISCHARGE  Patient: Hamilton Ayala (17 y.o. female)  Date: 5/20/2017  Diagnosis: OSTEOARTHRITIS LEFT KNEE  OA (osteoarthritis) of knee <principal problem not specified>  Procedure(s) (LRB):  LEFT TOTAL KNEE ARTHROPLASTY (Left) 2 Days Post-Op  Precautions:        ASSESSMENT:  Patient received supine in bed, agreeable to therapy. Reports mild nausea, but wishes to continue with therapy. Completed bed exercises per protocol, walked 300 feet, and ascended and descended stairs with good tolerance. Patient has met goals for PT, and is appropriate for DC at this time. Progression toward goals:  [X]          Improving appropriately and progressing toward goals  [ ]          Improving slowly and progressing toward goals  [ ]          Not making progress toward goals and plan of care will be adjusted       PLAN:  Patient will be discharged from physical therapy at this time. Rationale for discharge:  [X]     Goals Achieved  [ ]     Marilyn Corie  [ ]     Patient not participating in therapy  [ ]     Other:  Discharge Recommendations:  Home Health  Further Equipment Recommendations for Discharge:  RW       SUBJECTIVE:   Patient stated I can do it\".       OBJECTIVE DATA SUMMARY:   Critical Behavior:  Neurologic State: Alert  Orientation Level: Oriented X4            Functional Mobility Training:     Transfers:  Sit to Stand: Contact guard assistance  Stand to Sit: Contact guard assistance                             Balance:  Sitting: Intact  Standing: Intact; With support  Ambulation/Gait Training:  Distance (ft): 300 Feet (ft)  Assistive Device: Walker, rolling;Gait belt  Ambulation - Level of Assistance: Contact guard assistance;Stand-by asssistance        Gait Abnormalities: Antalgic              Speed/Karina: Slow  Step Length: Right shortened;Left shortened                 Stairs:  Number of Stairs Trained: 8  Stairs - Level of Assistance: Stand-by asssistance;Contact guard assistance  Rail Use: Right   Therapeutic Exercises:     EXERCISE   Sets   Reps   Active Active Assist   Passive Self ROM   Comments   Ankle Pumps 2 10 [X]                                           [ ]                                           [ ]                                           [ ]                                               Quad Sets     [X]                                           [ ]                                           [ ]                                           [ ]                                               Hamstring Sets     [ ]                                           [ ]                                           [ ]                                           [ ]                                               De Leon Bud     [ ]                                           [ ]                                           [ ]                                           [ ]                                               Knee Extension Stretch       [X]                                             [ ]                                             [ ]                                             [ ]                                               Heel Slides 1 10 [X] [ ]                                           [ ]                                           [ ]                                               Nino Roland 1 8 [X]                                           [ ]                                           [ ]                                           [ ]                                               Roscoe Goldberg     [ ]                                           [ ]                                           [ ]                                           [ ]                                               Straight Leg Raises   4 [X]                                           [ ]                                           [ ]                                           [ ]                                           Limited by fatigue and pain      Pain:  Pain Scale 1: Numeric (0 - 10)  Pain Intensity 1: 5  Pain Location 1: Knee  Pain Orientation 1: Left  Pain Description 1: Aching  Pain Intervention(s) 1: Medication (see MAR)  Activity Tolerance:   good  Please refer to the flowsheet for vital signs taken during this treatment.   After treatment:   [ ]  Patient left in no apparent distress sitting up in chair  [X]  Patient left in no apparent distress in bed  [X]  Call bell left within reach  [X]  Nursing notified  [ ]  Caregiver present  [ ]  Bed alarm activated      COMMUNICATION/COLLABORATION:   The patients plan of care was discussed with: Registered Nurse     Ayana Suárez, PT   Time Calculation: 25 mins

## 2017-05-20 NOTE — PROGRESS NOTES
Pharmacist Note  Warfarin Dosing  Consult provided for this 65 y.o. female to manage warfarin for Orthopedic Surgery (VTE prophylaxis)    INR Goal: 1.7- 2.2    Therapy Day: 3    Drugs that may increase INR:None  Drugs that may decrease INR: None  Other current anticoagulants/ drugs that may increase bleeding risk: NSAID  Risk factors: None  Daily INR ordered: YES    Recent Labs      05/20/17   0307  05/19/17   0354  05/18/17   1701   HGB  10.2*  11.6   --    INR  1.7*  1.0  1.0     Date               INR                 Dose  5/18                 1                     5 mg  5/19  1            5 mg  5/10                 1.7                  1 mg             Assessment/ Plan: Will order warfarin 1 mg PO x 1 dose. Pharmacy will continue to monitor daily and adjust therapy as indicated. Please contact the pharmacist at  for discharge recommendations if needed.

## 2017-05-20 NOTE — PROGRESS NOTES
Bedside shift change report given to CARL Jeffries (oncoming nurse) by LIDIA Lanier RN (offgoing nurse). Report included the following information SBAR, Kardex and Recent Results.

## 2017-05-20 NOTE — PROGRESS NOTES
Nursing Discharge Note:  Pt. Discharged top home via sister's car. All personal belongings with pt. Including Rx's. All discharge information reviewed with pt. And written info given. Pt had an opportunity to ask questions and verbalized understanding of all orders. VSS, Neurovascular  intact LLE. Had some nausea earlier but stated it was resolved.

## 2017-05-25 NOTE — DISCHARGE SUMMARY
Discharge Summary     Patient ID:  Cory Saab  [id number]  71 y.o.  1955    Admit Date: 5/18/2017    Discharge Date:    Admission Diagnoses: OSTEOARTHRITIS LEFT KNEE  OA (osteoarthritis) of knee    Surgeon: Tank Heath MD    Last Procedure: Procedure(s):  LEFT TOTAL KNEE ARTHROPLASTY      Hospital Course: Normal hospital course for this procedure. Significant Diagnostic Studies: none    Discharge Diagnosis: Active Problems:    OA (osteoarthritis) of knee (5/18/2017)         Condition on Discharge: good    Disposition:Home    Followup Care:  Discharge Condition: good  See surgical instructions  Regular Diet  Keep wound clean and dry    Follow-up Information     Follow up With Details Comments [address] [facility],    [address] [facility]  [phone]       Call in 1 day If you have not heard from them by noon on Monday please call facility. [facility]  [phone]     Call in 1 day If you have not heard from them by noon tomorrow please call facility. [facility] Bone 1216 med list:   Discharge Medication List as of 5/20/2017 10:14 AM      START taking these medications    Details   ondansetron (ZOFRAN ODT) 4 mg disintegrating tablet Take 1 Tab by mouth every eight (8) hours as needed for Nausea. , Print, Disp-30 Tab, R-0      !! oxyCODONE IR (ROXICODONE) 5 mg immediate release tablet Take 1 Tab by mouth every four (4) hours as needed for Pain. Max Daily Amount: 30 mg., Print, Disp-20 Tab, R-0      !! oxyCODONE IR (ROXICODONE) 5 mg immediate release tablet Take 2 Tabs by mouth every four (4) hours as needed for Pain. Max Daily Amount: 60 mg., Print, Disp-60 Tab, R-0      warfarin (COUMADIN) 2.5 mg tablet Take 1 Tab by mouth daily. , Print, Disp-60 Tab, R-3       !! - Potential duplicate medications found. Please discuss with provider.       CONTINUE these medications which have NOT CHANGED    Details cholecalciferol, vitamin D3, (VITAMIN D3) 2,000 unit tab Take  by mouth Daily (before breakfast). , Historical Med      loratadine-pseudoephedrine (CLARITIN-D 24 HOUR)  mg per tablet Take 1 Tab by mouth daily. USES GENERIC FOR [facility] AS NEEDED., Historical Med      dextroamphetamine-amphetamine (ADDERALL) 30 mg tablet Take 30 mg by mouth Daily (before breakfast). PT TAKES BREAKS FROM MED. \"4 OUT OF 7 DAYS\"      , Historical Med      diazepam (VALIUM) 5 mg tablet Take 1 tablet by mouth every eight (8) hours as needed for Anxiety. , Print, Disp-12 tablet, R-0         STOP taking these medications       hydrocodone-acetaminophen (NORCO) 5-325 mg per tablet Comments:   Reason for Stopping:                  Home Going Instructions:   Patient to follow up in one - two weeks. Notify my office if develop fever, chills, redness, unbearble pain or temp.>102. Patient to follow discharge instructions. Patient to call [phone] to set up follow up appointment.         Signed:  Marin Cole MD  5/25/2017  7:15 AM

## (undated) DEVICE — DEVON™ KNEE AND BODY STRAP 60" X 3" (1.5 M X 7.6 CM): Brand: DEVON

## (undated) DEVICE — 3M™ DURAPORE™ SURGICAL TAPE 2 INCHES X 10YARDS (5.0CM X 9.1M) 6ROLLS/CARTON 10CARTONS/CASE 1538-2: Brand: 3M™ DURAPORE™

## (undated) DEVICE — STRYKER PERFORMANCE SERIES SAGITTAL BLADE: Brand: STRYKER PERFORMANCE SERIES

## (undated) DEVICE — COVER,MAYO STAND,STERILE: Brand: MEDLINE

## (undated) DEVICE — PADDING CAST SPEC 6INX4YD COT --

## (undated) DEVICE — SOLUTION IRRIG 1000ML H2O STRL BLT

## (undated) DEVICE — SCRUB DRY SURG EZ SCRUB BRUSH PREOPERATIVE GRN

## (undated) DEVICE — SUTURE VCRL SZ 2 L54IN ABSRB UD L65MM TP-1 1/2 CIR J880T

## (undated) DEVICE — HANDLE LT SNAP ON ULT DURABLE LENS FOR TRUMPF ALC DISPOSABLE

## (undated) DEVICE — INFECTION CONTROL KIT SYS

## (undated) DEVICE — BLADE SAW W051XL276IN THK005IN CUT THK005IN REPL SAG FLR

## (undated) DEVICE — SUTURE MCRYL SZ 4-0 L27IN ABSRB UD L24MM PS-1 3/8 CIR PRIM Y935H

## (undated) DEVICE — Z CONVERTED USE 2271043 CONTAINER SPEC COLL 4OZ SCR ON LID PEEL PCH

## (undated) DEVICE — DRAPE,EXTREMITY,89X128,STERILE: Brand: MEDLINE

## (undated) DEVICE — FRAZIER SUCTION INSTRUMENT 12 FR W/CONTROL VENT & OBTURATOR: Brand: FRAZIER

## (undated) DEVICE — 3M™ IOBAN™ 2 ANTIMICROBIAL INCISE DRAPE 6651EZ: Brand: IOBAN™ 2

## (undated) DEVICE — SOLUTION IRRIG 3000ML 0.9% SOD CHL FLX CONT 0797208] ICU MEDICAL INC]

## (undated) DEVICE — NEEDLE HYPO 22GA L1.5IN BLK S STL HUB POLYPR SHLD REG BVL

## (undated) DEVICE — SUTURE VCRL SZ 2-0 L36IN ABSRB UD L40MM CT 1/2 CIR J957H

## (undated) DEVICE — SYRINGE MED 20ML STD CLR PLAS LUERLOCK TIP N CTRL DISP

## (undated) DEVICE — 2108 SERIES SAGITTAL BLADE AGGRESSIVE  (25.0 X 1.19 X 85.0MM)

## (undated) DEVICE — GOWN,SIRUS,POLYRNF,BRTHSLV,XL,30/CS: Brand: MEDLINE

## (undated) DEVICE — CUSTOM CAST PD STR

## (undated) DEVICE — STERILE POLYISOPRENE POWDER-FREE SURGICAL GLOVES WITH EMOLLIENT COATING: Brand: PROTEXIS

## (undated) DEVICE — PREP SKN PREVAIL 40ML APPL --

## (undated) DEVICE — (D)STRIP SKN CLSR 0.5X4IN WHT --

## (undated) DEVICE — TRAY CATH 16F URIN MTR LTX -- CONVERT TO ITEM 363111

## (undated) DEVICE — PADDING CST 4INX4YD --

## (undated) DEVICE — SUTURE STRATAFIX SYMMETRIC PDS + SZ 1 L18IN ABSRB VLT L48MM SXPP1A400

## (undated) DEVICE — REM POLYHESIVE ADULT PATIENT RETURN ELECTRODE: Brand: VALLEYLAB

## (undated) DEVICE — HOOK LOCK LATEX FREE ELASTIC BANDAGE D/L 6INX10YD

## (undated) DEVICE — 3M™ STERI-DRAPE™ U-DRAPE 1015: Brand: STERI-DRAPE™

## (undated) DEVICE — Device

## (undated) DEVICE — HANDPIECE SET WITH BONE CLEANING TIP AND SUCTION TUBE: Brand: INTERPULSE